# Patient Record
Sex: MALE | Race: ASIAN | NOT HISPANIC OR LATINO | URBAN - METROPOLITAN AREA
[De-identification: names, ages, dates, MRNs, and addresses within clinical notes are randomized per-mention and may not be internally consistent; named-entity substitution may affect disease eponyms.]

---

## 2021-04-19 ENCOUNTER — OUTPATIENT (OUTPATIENT)
Dept: OUTPATIENT SERVICES | Facility: HOSPITAL | Age: 71
LOS: 1 days | End: 2021-04-19
Payer: MEDICARE

## 2021-04-19 ENCOUNTER — APPOINTMENT (OUTPATIENT)
Dept: RADIOLOGY | Facility: CLINIC | Age: 71
End: 2021-04-19
Payer: MEDICARE

## 2021-04-19 ENCOUNTER — TRANSCRIPTION ENCOUNTER (OUTPATIENT)
Age: 71
End: 2021-04-19

## 2021-04-19 ENCOUNTER — APPOINTMENT (OUTPATIENT)
Dept: FAMILY MEDICINE | Facility: CLINIC | Age: 71
End: 2021-04-19
Payer: MEDICARE

## 2021-04-19 ENCOUNTER — NON-APPOINTMENT (OUTPATIENT)
Age: 71
End: 2021-04-19

## 2021-04-19 VITALS
WEIGHT: 184 LBS | HEIGHT: 69 IN | TEMPERATURE: 96.8 F | SYSTOLIC BLOOD PRESSURE: 128 MMHG | DIASTOLIC BLOOD PRESSURE: 82 MMHG | OXYGEN SATURATION: 98 % | BODY MASS INDEX: 27.25 KG/M2 | HEART RATE: 65 BPM

## 2021-04-19 DIAGNOSIS — Z13.29 ENCOUNTER FOR SCREENING FOR OTHER SUSPECTED ENDOCRINE DISORDER: ICD-10-CM

## 2021-04-19 DIAGNOSIS — Z13.1 ENCOUNTER FOR SCREENING FOR DIABETES MELLITUS: ICD-10-CM

## 2021-04-19 DIAGNOSIS — Z23 ENCOUNTER FOR IMMUNIZATION: ICD-10-CM

## 2021-04-19 DIAGNOSIS — Z13.220 ENCOUNTER FOR SCREENING FOR LIPOID DISORDERS: ICD-10-CM

## 2021-04-19 DIAGNOSIS — M25.511 PAIN IN RIGHT SHOULDER: ICD-10-CM

## 2021-04-19 DIAGNOSIS — Z11.59 ENCOUNTER FOR SCREENING FOR OTHER VIRAL DISEASES: ICD-10-CM

## 2021-04-19 DIAGNOSIS — Z13.21 ENCOUNTER FOR SCREENING FOR NUTRITIONAL DISORDER: ICD-10-CM

## 2021-04-19 DIAGNOSIS — Z12.11 ENCOUNTER FOR SCREENING FOR MALIGNANT NEOPLASM OF COLON: ICD-10-CM

## 2021-04-19 DIAGNOSIS — Z13.5 ENCOUNTER FOR SCREENING FOR EYE AND EAR DISORDERS: ICD-10-CM

## 2021-04-19 DIAGNOSIS — M25.561 PAIN IN RIGHT KNEE: ICD-10-CM

## 2021-04-19 DIAGNOSIS — Z00.00 ENCOUNTER FOR GENERAL ADULT MEDICAL EXAMINATION W/OUT ABNORMAL FINDINGS: ICD-10-CM

## 2021-04-19 DIAGNOSIS — Z86.39 PERSONAL HISTORY OF OTHER ENDOCRINE, NUTRITIONAL AND METABOLIC DISEASE: ICD-10-CM

## 2021-04-19 DIAGNOSIS — Z78.9 OTHER SPECIFIED HEALTH STATUS: ICD-10-CM

## 2021-04-19 PROCEDURE — G0439: CPT

## 2021-04-19 PROCEDURE — 73030 X-RAY EXAM OF SHOULDER: CPT | Mod: 26,RT

## 2021-04-19 PROCEDURE — 90732 PPSV23 VACC 2 YRS+ SUBQ/IM: CPT

## 2021-04-19 PROCEDURE — 93000 ELECTROCARDIOGRAM COMPLETE: CPT

## 2021-04-19 PROCEDURE — 73030 X-RAY EXAM OF SHOULDER: CPT

## 2021-04-19 PROCEDURE — 73562 X-RAY EXAM OF KNEE 3: CPT | Mod: 26,50

## 2021-04-19 PROCEDURE — 36415 COLL VENOUS BLD VENIPUNCTURE: CPT

## 2021-04-19 PROCEDURE — G0009: CPT

## 2021-04-19 PROCEDURE — 99214 OFFICE O/P EST MOD 30 MIN: CPT | Mod: 25

## 2021-04-19 PROCEDURE — 73562 X-RAY EXAM OF KNEE 3: CPT

## 2021-04-19 RX ORDER — ROSUVASTATIN CALCIUM 20 MG/1
20 TABLET, FILM COATED ORAL
Refills: 0 | Status: ACTIVE | COMMUNITY

## 2021-04-19 RX ORDER — METFORMIN ER 500 MG 500 MG/1
500 TABLET ORAL
Qty: 60 | Refills: 0 | Status: ACTIVE | COMMUNITY

## 2021-04-19 RX ORDER — ASPIRIN 81 MG
81 TABLET, DELAYED RELEASE (ENTERIC COATED) ORAL
Refills: 0 | Status: ACTIVE | COMMUNITY

## 2021-04-19 RX ORDER — LISINOPRIL 5 MG/1
5 TABLET ORAL
Refills: 0 | Status: ACTIVE | COMMUNITY

## 2021-04-19 NOTE — HEALTH RISK ASSESSMENT
[Good] : ~his/her~  mood as  good [No] : In the past 12 months have you used drugs other than those required for medical reasons? No [No falls in past year] : Patient reported no falls in the past year [0] : 2) Feeling down, depressed, or hopeless: Not at all (0) [None] : None [With Significant Other] : lives with significant other [Retired] : retired [College] : College [] :  [# Of Children ___] : has [unfilled] children [Sexually Active] : sexually active [Feels Safe at Home] : Feels safe at home [Fully functional (bathing, dressing, toileting, transferring, walking, feeding)] : Fully functional (bathing, dressing, toileting, transferring, walking, feeding) [Fully functional (using the telephone, shopping, preparing meals, housekeeping, doing laundry, using] : Fully functional and needs no help or supervision to perform IADLs (using the telephone, shopping, preparing meals, housekeeping, doing laundry, using transportation, managing medications and managing finances) [] : No [Reports changes in hearing] : Reports no changes in hearing [Reports changes in vision] : Reports no changes in vision [Reports changes in dental health] : Reports no changes in dental health [ColonoscopyDate] :  never

## 2021-04-19 NOTE — HISTORY OF PRESENT ILLNESS
[FreeTextEntry1] : new patient is here to establish care, has knee pain [de-identified] : Dr latosha tolliver - last seen 1 yr ago . no chest pain, no sob, no cough, no fever, no dizziness, no abdominal pain, no n/v/d/c/melena/brbpr/hematuria/dysuria, gerd positive, admits to chest pain 6 months ago - states for a few weeks and then resolved .  right shoulder pain  chronic tenderness on lateral shoulder \par \par bilateral knee pain - 2 yrs intermittent - now worsening x 3 months - worse with stairs    \par Dr oshea - endocrinology every 3 months - 1 week ago \par

## 2021-04-19 NOTE — PHYSICAL EXAM
[No Acute Distress] : no acute distress [Well Nourished] : well nourished [Well Developed] : well developed [Well-Appearing] : well-appearing [Normal Voice/Communication] : normal voice/communication [Normal Sclera/Conjunctiva] : normal sclera/conjunctiva [PERRL] : pupils equal round and reactive to light [EOMI] : extraocular movements intact [Normal Outer Ear/Nose] : the outer ears and nose were normal in appearance [Normal Oropharynx] : the oropharynx was normal [Normal TMs] : both tympanic membranes were normal [No JVD] : no jugular venous distention [No Lymphadenopathy] : no lymphadenopathy [Supple] : supple [Thyroid Normal, No Nodules] : the thyroid was normal and there were no nodules present [No Respiratory Distress] : no respiratory distress  [No Accessory Muscle Use] : no accessory muscle use [Clear to Auscultation] : lungs were clear to auscultation bilaterally [Normal Rate] : normal rate  [Regular Rhythm] : with a regular rhythm [Normal S1, S2] : normal S1 and S2 [No Murmur] : no murmur heard [No Carotid Bruits] : no carotid bruits [No Abdominal Bruit] : a ~M bruit was not heard ~T in the abdomen [No Varicosities] : no varicosities [Pedal Pulses Present] : the pedal pulses are present [No Edema] : there was no peripheral edema [No Palpable Aorta] : no palpable aorta [No Extremity Clubbing/Cyanosis] : no extremity clubbing/cyanosis [Soft] : abdomen soft [Non Tender] : non-tender [Non-distended] : non-distended [No Masses] : no abdominal mass palpated [No HSM] : no HSM [Normal Bowel Sounds] : normal bowel sounds [Normal Posterior Cervical Nodes] : no posterior cervical lymphadenopathy [Normal Anterior Cervical Nodes] : no anterior cervical lymphadenopathy [No CVA Tenderness] : no CVA  tenderness [No Spinal Tenderness] : no spinal tenderness [No Joint Swelling] : no joint swelling [Grossly Normal Strength/Tone] : grossly normal strength/tone [No Rash] : no rash [Coordination Grossly Intact] : coordination grossly intact [No Focal Deficits] : no focal deficits [Normal Gait] : normal gait [Speech Grossly Normal] : speech grossly normal [Normal Affect] : the affect was normal [Alert and Oriented x3] : oriented to person, place, and time [Normal Mood] : the mood was normal [Normal Insight/Judgement] : insight and judgment were intact [de-identified] : normal rom of knees and shoulders- some discomfort on right knee with flexion , tenderness of lateral right shoulder

## 2021-04-22 ENCOUNTER — NON-APPOINTMENT (OUTPATIENT)
Age: 71
End: 2021-04-22

## 2021-04-22 ENCOUNTER — APPOINTMENT (OUTPATIENT)
Dept: CARDIOLOGY | Facility: CLINIC | Age: 71
End: 2021-04-22
Payer: MEDICARE

## 2021-04-22 VITALS
DIASTOLIC BLOOD PRESSURE: 90 MMHG | SYSTOLIC BLOOD PRESSURE: 120 MMHG | BODY MASS INDEX: 27.25 KG/M2 | HEART RATE: 67 BPM | WEIGHT: 184 LBS | OXYGEN SATURATION: 96 % | HEIGHT: 69 IN

## 2021-04-22 DIAGNOSIS — R07.9 CHEST PAIN, UNSPECIFIED: ICD-10-CM

## 2021-04-22 DIAGNOSIS — R94.31 ABNORMAL ELECTROCARDIOGRAM [ECG] [EKG]: ICD-10-CM

## 2021-04-22 LAB
25(OH)D3 SERPL-MCNC: 35.5 NG/ML
ALBUMIN SERPL ELPH-MCNC: 4.4 G/DL
ALP BLD-CCNC: 50 U/L
ALT SERPL-CCNC: 23 U/L
ANION GAP SERPL CALC-SCNC: 12 MMOL/L
AST SERPL-CCNC: 29 U/L
BASOPHILS # BLD AUTO: 0.04 K/UL
BASOPHILS NFR BLD AUTO: 0.8 %
BILIRUB SERPL-MCNC: 0.9 MG/DL
BUN SERPL-MCNC: 14 MG/DL
CALCIUM SERPL-MCNC: 9.2 MG/DL
CHLORIDE SERPL-SCNC: 105 MMOL/L
CHOLEST SERPL-MCNC: 159 MG/DL
CO2 SERPL-SCNC: 21 MMOL/L
COVID-19 NUCLEOCAPSID  GAM ANTIBODY INTERPRETATION: NEGATIVE
COVID-19 SPIKE DOMAIN ANTIBODY INTERPRETATION: POSITIVE
CREAT SERPL-MCNC: 0.82 MG/DL
EOSINOPHIL # BLD AUTO: 0.19 K/UL
EOSINOPHIL NFR BLD AUTO: 3.9 %
ESTIMATED AVERAGE GLUCOSE: 140 MG/DL
GLUCOSE SERPL-MCNC: 113 MG/DL
HBA1C MFR BLD HPLC: 6.5 %
HCT VFR BLD CALC: 45.3 %
HDLC SERPL-MCNC: 46 MG/DL
HGB BLD-MCNC: 14.6 G/DL
IMM GRANULOCYTES NFR BLD AUTO: 0.2 %
LDLC SERPL CALC-MCNC: 97 MG/DL
LYMPHOCYTES # BLD AUTO: 1.94 K/UL
LYMPHOCYTES NFR BLD AUTO: 39.4 %
MAN DIFF?: NORMAL
MCHC RBC-ENTMCNC: 28.7 PG
MCHC RBC-ENTMCNC: 32.2 GM/DL
MCV RBC AUTO: 89 FL
MONOCYTES # BLD AUTO: 0.53 K/UL
MONOCYTES NFR BLD AUTO: 10.8 %
NEUTROPHILS # BLD AUTO: 2.21 K/UL
NEUTROPHILS NFR BLD AUTO: 44.9 %
NONHDLC SERPL-MCNC: 114 MG/DL
PLATELET # BLD AUTO: 169 K/UL
POTASSIUM SERPL-SCNC: 4.5 MMOL/L
PROT SERPL-MCNC: 7.4 G/DL
PSA FREE FLD-MCNC: 31 %
PSA FREE SERPL-MCNC: 0.34 NG/ML
PSA SERPL-MCNC: 1.11 NG/ML
RBC # BLD: 5.09 M/UL
RBC # FLD: 13.2 %
SARS-COV-2 AB SERPL IA-ACNC: >250 U/ML
SARS-COV-2 AB SERPL QL IA: 0.09 INDEX
SODIUM SERPL-SCNC: 138 MMOL/L
TRIGL SERPL-MCNC: 86 MG/DL
TSH SERPL-ACNC: 0.7 UIU/ML
WBC # FLD AUTO: 4.92 K/UL

## 2021-04-22 PROCEDURE — 99204 OFFICE O/P NEW MOD 45 MIN: CPT | Mod: 25

## 2021-04-22 PROCEDURE — 93000 ELECTROCARDIOGRAM COMPLETE: CPT

## 2021-04-22 NOTE — DISCUSSION/SUMMARY
[FreeTextEntry1] : Pt is a 71 y/o M who is referred here today by their PCP for evaluation.  Pt has PMH HTN, HLD, DM. He states that he has been getting non-radiating CP.  When he gets it, lasts one week on/off.  Not associated with exertion.  He denies SOB, diaphoresis, palpitations, dizziness, syncope, LE edema, PND, orthopnea. \par COVID vaccine 03/2021 Pfizer\par \par Will check transthoracic echocardiogram to evaluate left ventricular function and assess for any structural abnormalities\par Will check nuclear stress test to eval for ischemia\par check CUS\par \par HTN:\par mildly elevated - will monitor\par c/w current meds\par Advised low salt diet\par \par HLD:\par c/w statin - goal LDL <70\par Advised lifestyle modifications\par \par DM:\par follows with PCP\par goal A1c <7\par \par The described plan was discussed with the pt.  All questions and concerns were addressed to the best of my knowledge.

## 2021-04-22 NOTE — REASON FOR VISIT
[Consultation] : a consultation regarding [Chest Pain] : chest pain [Hyperlipidemia] : hyperlipidemia [Hypertension] : hypertension

## 2021-04-22 NOTE — HISTORY OF PRESENT ILLNESS
[FreeTextEntry1] : Pt is a 69 y/o M who is referred here today by their PCP for evaluation.  Pt has PMH HTN, HLD, DM. He states that he has been getting non-radiating CP.  When he gets it, lasts one week on/off.  Not associated with exertion.  He denies SOB, diaphoresis, palpitations, dizziness, syncope, LE edema, PND, orthopnea. \par COVID vaccine 2021 Pfizer\par \par PMH: HTN, HLD, DM\par Family hx: no cardiac disease\par Smoking status: never\par social ETOH\par no drug use\par Current exercise: none\par Daily water intake: 32 oz\par Daily caffeine intake: 2 cups coffee/tea\par OTC medications: ASA, B12\par Previous cardiac testin stress test "normal"\par Previous hospitalizations: none\par

## 2021-04-23 ENCOUNTER — APPOINTMENT (OUTPATIENT)
Dept: CARDIOLOGY | Facility: CLINIC | Age: 71
End: 2021-04-23
Payer: MEDICARE

## 2021-04-23 PROCEDURE — 93017 CV STRESS TEST TRACING ONLY: CPT

## 2021-04-23 PROCEDURE — 93018 CV STRESS TEST I&R ONLY: CPT

## 2021-04-23 PROCEDURE — 78452 HT MUSCLE IMAGE SPECT MULT: CPT

## 2021-04-23 PROCEDURE — A9500: CPT

## 2021-04-27 ENCOUNTER — APPOINTMENT (OUTPATIENT)
Dept: CARDIOLOGY | Facility: CLINIC | Age: 71
End: 2021-04-27
Payer: MEDICARE

## 2021-04-27 PROCEDURE — 93306 TTE W/DOPPLER COMPLETE: CPT

## 2021-04-29 ENCOUNTER — NON-APPOINTMENT (OUTPATIENT)
Age: 71
End: 2021-04-29

## 2021-10-03 ENCOUNTER — TRANSCRIPTION ENCOUNTER (OUTPATIENT)
Age: 71
End: 2021-10-03

## 2022-02-28 ENCOUNTER — TRANSCRIPTION ENCOUNTER (OUTPATIENT)
Age: 72
End: 2022-02-28

## 2022-03-28 ENCOUNTER — APPOINTMENT (OUTPATIENT)
Dept: FAMILY MEDICINE | Facility: CLINIC | Age: 72
End: 2022-03-28
Payer: MEDICARE

## 2022-03-28 ENCOUNTER — NON-APPOINTMENT (OUTPATIENT)
Age: 72
End: 2022-03-28

## 2022-03-28 VITALS
BODY MASS INDEX: 27.25 KG/M2 | HEIGHT: 69 IN | OXYGEN SATURATION: 97 % | HEART RATE: 83 BPM | TEMPERATURE: 97.3 F | DIASTOLIC BLOOD PRESSURE: 80 MMHG | SYSTOLIC BLOOD PRESSURE: 134 MMHG | WEIGHT: 184 LBS

## 2022-03-28 DIAGNOSIS — H26.9 UNSPECIFIED CATARACT: ICD-10-CM

## 2022-03-28 PROCEDURE — 93000 ELECTROCARDIOGRAM COMPLETE: CPT

## 2022-03-28 PROCEDURE — 99214 OFFICE O/P EST MOD 30 MIN: CPT | Mod: 25

## 2022-03-29 NOTE — ASSESSMENT
[As per surgery] : as per surgery [Patient Optimized for Surgery] : Patient optimized for surgery [FreeTextEntry4] : 70 yo male with pmh of diabetes, hld presents for clearance for cataract removal

## 2022-03-29 NOTE — PLAN
[FreeTextEntry1] : recent labs from endocrinology stable \par cardio reviewed repeat ekg stable\par pt medically optimized for procedure

## 2022-03-29 NOTE — HISTORY OF PRESENT ILLNESS
[No Pertinent Cardiac History] : no history of aortic stenosis, atrial fibrillation, coronary artery disease, recent myocardial infarction, or implantable device/pacemaker [No Pertinent Pulmonary History] : no history of asthma, COPD, sleep apnea, or smoking [No Adverse Anesthesia Reaction] : no adverse anesthesia reaction in self or family member [Diabetes] : diabetes [(Patient denies any chest pain, claudication, dyspnea on exertion, orthopnea, palpitations or syncope)] : Patient denies any chest pain, claudication, dyspnea on exertion, orthopnea, palpitations or syncope [Chronic Anticoagulation] : no chronic anticoagulation [Chronic Kidney Disease] : no chronic kidney disease [FreeTextEntry1] : cataracts [FreeTextEntry2] : 4/5/22 [FreeTextEntry3] : dr palacios [FreeTextEntry4] : no chest pain, no sob, no cough, no fever, no dizziness, no abdominal pain, no n/v/d/c/melena/brbpr/hematuria/dysuria\par

## 2022-07-28 ENCOUNTER — NON-APPOINTMENT (OUTPATIENT)
Age: 72
End: 2022-07-28

## 2022-09-13 ENCOUNTER — NON-APPOINTMENT (OUTPATIENT)
Age: 72
End: 2022-09-13

## 2022-09-13 ENCOUNTER — APPOINTMENT (OUTPATIENT)
Dept: FAMILY MEDICINE | Facility: CLINIC | Age: 72
End: 2022-09-13

## 2022-09-13 VITALS
WEIGHT: 182 LBS | SYSTOLIC BLOOD PRESSURE: 130 MMHG | DIASTOLIC BLOOD PRESSURE: 80 MMHG | HEIGHT: 69 IN | TEMPERATURE: 96.7 F | HEART RATE: 82 BPM | OXYGEN SATURATION: 99 % | BODY MASS INDEX: 26.96 KG/M2

## 2022-09-13 DIAGNOSIS — H43.399 OTHER VITREOUS OPACITIES, UNSPECIFIED EYE: ICD-10-CM

## 2022-09-13 PROCEDURE — 93000 ELECTROCARDIOGRAM COMPLETE: CPT

## 2022-09-13 PROCEDURE — 99213 OFFICE O/P EST LOW 20 MIN: CPT | Mod: 25

## 2022-09-13 PROCEDURE — 36415 COLL VENOUS BLD VENIPUNCTURE: CPT

## 2022-09-13 RX ORDER — BROMFENAC 0.76 MG/ML
0.07 SOLUTION/ DROPS OPHTHALMIC
Qty: 5 | Refills: 0 | Status: DISCONTINUED | COMMUNITY
Start: 2022-04-20

## 2022-09-13 RX ORDER — PANTOPRAZOLE 40 MG/1
40 TABLET, DELAYED RELEASE ORAL
Qty: 90 | Refills: 0 | Status: ACTIVE | COMMUNITY
Start: 2022-08-25

## 2022-09-13 RX ORDER — LANCETS 33 GAUGE
EACH MISCELLANEOUS
Qty: 100 | Refills: 0 | Status: ACTIVE | COMMUNITY
Start: 2022-03-30

## 2022-09-13 RX ORDER — MOXIFLOXACIN OPHTHALMIC 5 MG/ML
0.5 SOLUTION/ DROPS OPHTHALMIC
Qty: 3 | Refills: 0 | Status: DISCONTINUED | COMMUNITY
Start: 2022-04-29

## 2022-09-13 RX ORDER — BLOOD SUGAR DIAGNOSTIC
STRIP MISCELLANEOUS
Qty: 50 | Refills: 0 | Status: ACTIVE | COMMUNITY
Start: 2022-03-30

## 2022-09-14 LAB
ALBUMIN SERPL ELPH-MCNC: 4.4 G/DL
ALP BLD-CCNC: 46 U/L
ALT SERPL-CCNC: 20 U/L
ANION GAP SERPL CALC-SCNC: 8 MMOL/L
APPEARANCE: CLEAR
AST SERPL-CCNC: 18 U/L
BACTERIA: NEGATIVE
BASOPHILS # BLD AUTO: 0.04 K/UL
BASOPHILS NFR BLD AUTO: 0.9 %
BILIRUB SERPL-MCNC: 0.6 MG/DL
BILIRUBIN URINE: NEGATIVE
BLOOD URINE: NEGATIVE
BUN SERPL-MCNC: 14 MG/DL
CALCIUM SERPL-MCNC: 9.7 MG/DL
CHLORIDE SERPL-SCNC: 105 MMOL/L
CHOLEST SERPL-MCNC: 156 MG/DL
CO2 SERPL-SCNC: 24 MMOL/L
COLOR: YELLOW
CREAT SERPL-MCNC: 0.84 MG/DL
EGFR: 93 ML/MIN/1.73M2
EOSINOPHIL # BLD AUTO: 0.2 K/UL
EOSINOPHIL NFR BLD AUTO: 4.6 %
ESTIMATED AVERAGE GLUCOSE: 140 MG/DL
GLUCOSE QUALITATIVE U: NEGATIVE
GLUCOSE SERPL-MCNC: 116 MG/DL
HBA1C MFR BLD HPLC: 6.5 %
HCT VFR BLD CALC: 44.4 %
HDLC SERPL-MCNC: 45 MG/DL
HGB BLD-MCNC: 15.1 G/DL
HYALINE CASTS: 0 /LPF
IMM GRANULOCYTES NFR BLD AUTO: 0.2 %
KETONES URINE: NEGATIVE
LDLC SERPL CALC-MCNC: 92 MG/DL
LEUKOCYTE ESTERASE URINE: NEGATIVE
LYMPHOCYTES # BLD AUTO: 1.69 K/UL
LYMPHOCYTES NFR BLD AUTO: 38.7 %
MAN DIFF?: NORMAL
MCHC RBC-ENTMCNC: 28.5 PG
MCHC RBC-ENTMCNC: 34 GM/DL
MCV RBC AUTO: 83.9 FL
MICROSCOPIC-UA: NORMAL
MONOCYTES # BLD AUTO: 0.35 K/UL
MONOCYTES NFR BLD AUTO: 8 %
NEUTROPHILS # BLD AUTO: 2.08 K/UL
NEUTROPHILS NFR BLD AUTO: 47.6 %
NITRITE URINE: NEGATIVE
NONHDLC SERPL-MCNC: 111 MG/DL
PH URINE: 6
PLATELET # BLD AUTO: 182 K/UL
POTASSIUM SERPL-SCNC: 4.8 MMOL/L
PROT SERPL-MCNC: 7 G/DL
PROTEIN URINE: NEGATIVE
RBC # BLD: 5.29 M/UL
RBC # FLD: 13.2 %
RED BLOOD CELLS URINE: 3 /HPF
SODIUM SERPL-SCNC: 137 MMOL/L
SPECIFIC GRAVITY URINE: 1.02
SQUAMOUS EPITHELIAL CELLS: 0 /HPF
TRIGL SERPL-MCNC: 96 MG/DL
TSH SERPL-ACNC: 0.9 UIU/ML
UROBILINOGEN URINE: NORMAL
WBC # FLD AUTO: 4.37 K/UL
WHITE BLOOD CELLS URINE: 1 /HPF

## 2022-09-14 NOTE — HISTORY OF PRESENT ILLNESS
[No Pertinent Cardiac History] : no history of aortic stenosis, atrial fibrillation, coronary artery disease, recent myocardial infarction, or implantable device/pacemaker [No Pertinent Pulmonary History] : no history of asthma, COPD, sleep apnea, or smoking [No Adverse Anesthesia Reaction] : no adverse anesthesia reaction in self or family member [Diabetes] : diabetes [(Patient denies any chest pain, claudication, dyspnea on exertion, orthopnea, palpitations or syncope)] : Patient denies any chest pain, claudication, dyspnea on exertion, orthopnea, palpitations or syncope [Chronic Anticoagulation] : no chronic anticoagulation [Chronic Kidney Disease] : no chronic kidney disease [FreeTextEntry1] : vitrectomy  [FreeTextEntry2] : 9/18/22 [FreeTextEntry3] : Dr holman [FreeTextEntry4] : no chest pain, no sob, no cough, no fever, no dizziness, no abdominal pain, no n/v/d/c/melena/brbpr/hematuria/dysuria\par

## 2022-09-14 NOTE — ASSESSMENT
[As per surgery] : as per surgery [Patient Optimized for Surgery] : Patient optimized for surgery [FreeTextEntry4] : 70 yo male with pmh of diabetes, hld presents for clearance for vitrectomy

## 2022-10-31 ENCOUNTER — APPOINTMENT (OUTPATIENT)
Dept: ORTHOPEDIC SURGERY | Facility: CLINIC | Age: 72
End: 2022-10-31

## 2022-10-31 VITALS — HEIGHT: 69 IN | WEIGHT: 182 LBS | BODY MASS INDEX: 26.96 KG/M2

## 2022-10-31 PROCEDURE — 99214 OFFICE O/P EST MOD 30 MIN: CPT | Mod: 25

## 2022-10-31 PROCEDURE — 20611 DRAIN/INJ JOINT/BURSA W/US: CPT | Mod: LT

## 2022-10-31 NOTE — DISCUSSION/SUMMARY
[de-identified] : Patient received marley knee CSI. He will follow up if the CSI provides no relief. We will then consider hyaluronic acid injections. \par \par Cortisone Knee Injection - Right, ultrasound guided injection \par \par The risks, benefits, and alternatives to cortisone injection were explained in full to the patient. Risks outlined include but are not limited to infection, sepsis, bleeding, scarring, skin discoloration, temporary increase in pain, syncopal episode, failure to resolve symptoms, allergic reaction, symptom recurrence, and elevation of blood sugar in diabetics. Patient understood the risks. All questions were answered. After discussion of options, patient requested an injection. Oral informed consent was obtained and sterile prep was done of the injection site. A mixture of 40mg of Kenalog, 2cc of 1% Lidocaine was sterilely prepared by me. Sterile technique was used to introduce the mixture into the right knee. Ultrasound was used for proper needle placement. Patient tolerated the procedure well. Advised to ice the injection site this evening.\par \par \par RB&A to corticosteroid injection discussed.\par All questions were answered.\par Patient wishes to move forward with injection today. \par \par \par Cortisone Knee Injection - Left, ultrasound guided injection\par \par The risks, benefits, and alternatives to cortisone injection were explained in full to the patient. Risks outlined include but are not limited to infection, sepsis, bleeding, scarring, skin discoloration, temporary increase in pain, syncopal episode, failure to resolve symptoms, allergic reaction, symptom recurrence, and elevation of blood sugar in diabetics. Patient understood the risks. All questions were answered. After discussion of options, patient requested an injection. Oral informed consent was obtained and sterile prep was done of the injection site. A mixture of 40mg of Kenalog, 2cc of 1% Lidocaine was sterilely prepared by me. Sterile technique was used to introduce the mixture into the left knee. Ultrasound was used for proper needle placement. Patient tolerated the procedure well. Advised to ice the injection site this evening.\par \par \par RB&A to corticosteroid injection discussed.\par All questions were answered.\par Patient wishes to move forward with injection today. \par \par \par "Written by Cesar Enrique, acting as Scribe for Chapo Stewart M.D" \par \par Home Exercise \par \par  The patient is instructed on a home exercise program. \par  \par RICE \par I explained to the patient that rest, ice, compression, and elevation would benefit them.  They may return to activity after follow-up or when they no longer have any pain. \par  \par Pain Guide Activities \par The patient was advised to let pain guide the gradual advancement of activities. \par  \par Activity Modification \par The patient was advised to modify their activities. \par  \par Dx / Natural History \par The patient was advised of the diagnosis.  The natural history of the pathology was explained in full to the patient in layman's terms.  Several different treatment options were discussed and explained in full to the patient including the risks and benefits of both surgical and non-surgical treatments.  All questions and concerns were answered.\par

## 2022-10-31 NOTE — HISTORY OF PRESENT ILLNESS
[de-identified] : 10/31/22: Patient presents for FUV marley knees. He reports that his knee pain has retuned. After his last visit apx 10 months ago his knee pain was not bad, has recent returned. \par \par 12/20/21: Patient presents for FUV marley knee. The gel injections that the patient received helped relieve pain. \par 11/10/21: Patient presents for follow up visit marley knees R>L medial. Had an allergic reaction to Mobic. Had itching and\par rash all over body. Reports that his knees are better than last visit. When he uses a knee brace that helps relieve pain.\par He would like to be able to have no pain without the knee brace. Continues to have pain after use of NSAIDs, Mobic\par and cortisone injections.\par Date of Injury/Onset: 3 years \par Pain: At Rest: 0/10 \par With Activity: 1-2/10 \par Mechanism of injury: none\par This is not a Work Related Injury being treated under Worker's Compensation.\par This is not an athletic injury occurring associated with an interscholastic or organized sports team.\par Quality of symptoms: achy \par Improves with: rest\par Worse with: stairs, standing , overuse \par Treatment/Imaging/Studies Since Last Visit: PT & Naprosyn \par Reports Available For Review Today: n/a\par Out of work/sport: _, since _\par School/Sport/Position/Occupation: retired \par Additional Information: None

## 2022-10-31 NOTE — DATA REVIEWED
[FreeTextEntry1] : 11/8/21 MRI RIGHT KNEE CHANI KRISHNAN\par Impression:\par 1. Complex medial meniscal tearing, lateral meniscal degeneration and tricompartmental arthrosis with subchondral\par edema in the medial and patellofemoral compartments with \par subchondral cysts in the posterior medial femoral condyle which appear degenerative.\par 2. Mild effusion, synovitis, small popliteal cyst and medial gastrocnemius muscle strain.\par 3. Mild chronic MCL sprain with laxity.\par 4. Extensor mechanism tendinopathy with insertional quadriceps tendon bone spurs without tear.\par 5. Lateral meniscal degeneration and fraying without tear.\par 6 No acute fracture

## 2022-10-31 NOTE — PHYSICAL EXAM
[Bilateral] : knee bilaterally [5___] : hamstring 5[unfilled]/5 [Positive] : positive Levi [] : non-antalgic

## 2022-11-01 ENCOUNTER — OFFICE (OUTPATIENT)
Dept: URBAN - METROPOLITAN AREA CLINIC 88 | Facility: CLINIC | Age: 72
Setting detail: OPHTHALMOLOGY
End: 2022-11-01
Payer: MEDICARE

## 2022-11-01 DIAGNOSIS — H35.372: ICD-10-CM

## 2022-11-01 PROCEDURE — 99024 POSTOP FOLLOW-UP VISIT: CPT | Performed by: OPHTHALMOLOGY

## 2022-11-01 ASSESSMENT — LID EXAM ASSESSMENTS
OD_BLEPHARITIS: 1+
OS_BLEPHARITIS: 1+

## 2022-11-01 ASSESSMENT — REFRACTION_AUTOREFRACTION
OD_AXIS: 162
OS_CYLINDER: -0.25
OS_AXIS: 158
OD_CYLINDER: -0.25
OS_SPHERE: -0.75
OD_SPHERE: -0.50

## 2022-11-01 ASSESSMENT — AXIALLENGTH_DERIVED
OD_AL: 25.4922
OS_AL: 26.0466

## 2022-11-01 ASSESSMENT — TONOMETRY: OD_IOP_MMHG: 10

## 2022-11-01 ASSESSMENT — KERATOMETRY
OS_AXISANGLE_DEGREES: 180
OD_K2POWER_DIOPTERS: 39.50
OD_K1POWER_DIOPTERS: 39.25
OS_K2POWER_DIOPTERS: 38.50
OD_AXISANGLE_DEGREES: 059
OS_K1POWER_DIOPTERS: 38.25

## 2022-11-01 ASSESSMENT — VISUAL ACUITY
OS_BCVA: 20/25
OD_BCVA: 20/50-

## 2022-11-01 ASSESSMENT — SPHEQUIV_DERIVED
OS_SPHEQUIV: -0.875
OD_SPHEQUIV: -0.625

## 2022-11-01 ASSESSMENT — CONFRONTATIONAL VISUAL FIELD TEST (CVF)
OS_FINDINGS: FULL
OD_FINDINGS: FULL

## 2022-11-04 ENCOUNTER — NON-APPOINTMENT (OUTPATIENT)
Age: 72
End: 2022-11-04

## 2022-11-09 ENCOUNTER — APPOINTMENT (OUTPATIENT)
Dept: ORTHOPEDIC SURGERY | Facility: CLINIC | Age: 72
End: 2022-11-09

## 2022-11-09 PROCEDURE — 73564 X-RAY EXAM KNEE 4 OR MORE: CPT | Mod: 50

## 2022-11-09 PROCEDURE — 20611 DRAIN/INJ JOINT/BURSA W/US: CPT

## 2022-11-09 PROCEDURE — 99214 OFFICE O/P EST MOD 30 MIN: CPT | Mod: 25

## 2022-11-11 NOTE — PHYSICAL EXAM
[Bilateral] : knee bilaterally [5___] : hamstring 5[unfilled]/5 [Positive] : positive Levi [de-identified] : 11/9/22 x-ray 4 views BILATERAL knees: Unchanged from prior  [] : non-antalgic

## 2022-11-11 NOTE — DISCUSSION/SUMMARY
[de-identified] : Follow up after MRI of right knee to eval medial meniscus tear. \par \par Patient had flushing reaction to CSI which has now resolved.\par \par Bilateral MONOVISC injections administered today using ultrasound for proper placement. \par --\par MONOVISC RIGHT KNEE Ultrasound Guided \par Procedure Verification:\par Procedure confirmed with patient or family/designee\par Consent for procedure: Verbal Consent Given\par Relevant documentation completed, reviewed, and signed\par Clinical indications for procedure confirmed\par \par Time-out with all members of procedure team immediately prior to procedure:\par Correct patient identified. Agreement on procedure. Correct side and site.\par \par KNEE INTRAARTICULAR INJECTION - RIGHT\par After verbal consent and identification of the correct patient and correct site, the RIGHT knee was prepped using alcohol swabs and betadine. This was allowed time to air dry.  The pre-loaded MONOVISC was injected into the RIGHT knee via the lateral knee portal with at 1 ½ inch 22G needle.  The patient tolerated the procedure well.  A sterile dressing was placed.  After-care instructions were provided and included instructions to ice the area and to call if redness, pain, or fever develop.\par  \par  --\par \par MONOVISC LEFT KNEE - Ultrasound Guided  \par Procedure Verification:\par Procedure confirmed with patient or family/designee\par Consent for procedure: Verbal Consent Given\par Relevant documentation completed, reviewed, and signed\par Clinical indications for procedure confirmed\par \par Time-out with all members of procedure team immediately prior to procedure:\par Correct patient identified. Agreement on procedure. Correct side and site.\par \par KNEE INTRAARTICULAR INJECTION - LEFT\par After verbal consent and identification of the correct patient and correct site, the LEFT knee was prepped using alcohol swabs and betadine. This was allowed time to air dry.  The pre-loaded MONOVISC was injected into the LEFT knee via the lateral knee portal with at 1 ½ inch 22G needle.  The patient tolerated the procedure well.  A sterile dressing was placed.  After-care instructions were provided and included instructions to ice the area and to call if redness, pain, or fever develop.\par \par -----------------------------------------------\par Home Exercise\par The patient is instructed on a home exercise program.\par \par OLGA DIXON Acting as a Scribe for Dr. Stewart\par I, Olga Dixon, attest that this documentation has been prepared under the direction and in the presence of Provider Chapo Stewart MD.\par \par Activity Modification\par The patient was advised to modify their activities.\par \par Dx / Natural History\par The patient was advised of the diagnosis.  The natural history of the pathology was explained in full to the patient in layman's terms.  Several different treatment options were discussed and explained in full to the patient including the risks and benefits of both surgical and non-surgical treatments.  All questions and concerns were answered.\par \par Pain Guide Activities\par The patient was advised to let pain guide the gradual advancement of activities.\par \par RICE\par I explained to the patient that rest, ice, compression, and elevation would benefit them.  They may return to activity after follow-up or when they no longer have any pain. \par  \par

## 2022-11-11 NOTE — HISTORY OF PRESENT ILLNESS
[de-identified] : 10/31/22: Patient presents for FUV marley knees. He reports that his knee pain has retuned. After his last visit apx 10 months ago his knee pain was not bad, has recent returned. \par \par 12/20/21: Patient presents for FUV marley knee. The gel injections that the patient received helped relieve pain. \par 11/10/21: Patient presents for follow up visit marley knees R>L medial. Had an allergic reaction to Mobic. Had itching and\par rash all over body. Reports that his knees are better than last visit. When he uses a knee brace that helps relieve pain.\par He would like to be able to have no pain without the knee brace. Continues to have pain after use of NSAIDs, Mobic\par and cortisone injections.\par Date of Injury/Onset: 3 years \par Pain: At Rest: 0/10 \par With Activity: 1-2/10 \par Mechanism of injury: none\par This is not a Work Related Injury being treated under Worker's Compensation.\par This is not an athletic injury occurring associated with an interscholastic or organized sports team.\par Quality of symptoms: achy \par Improves with: rest\par Worse with: stairs, standing , overuse \par Treatment/Imaging/Studies Since Last Visit: PT & Naprosyn \par Reports Available For Review Today: n/a\par Out of work/sport: _, since _\par School/Sport/Position/Occupation: retired \par Additional Information: None

## 2022-11-17 ENCOUNTER — FORM ENCOUNTER (OUTPATIENT)
Age: 72
End: 2022-11-17

## 2022-11-18 ENCOUNTER — APPOINTMENT (OUTPATIENT)
Dept: MRI IMAGING | Facility: CLINIC | Age: 72
End: 2022-11-18

## 2022-11-18 PROCEDURE — 73721 MRI JNT OF LWR EXTRE W/O DYE: CPT | Mod: RT,MH

## 2022-12-05 ENCOUNTER — APPOINTMENT (OUTPATIENT)
Dept: ORTHOPEDIC SURGERY | Facility: CLINIC | Age: 72
End: 2022-12-05

## 2023-01-19 ENCOUNTER — APPOINTMENT (OUTPATIENT)
Dept: FAMILY MEDICINE | Facility: CLINIC | Age: 73
End: 2023-01-19
Payer: MEDICARE

## 2023-01-19 ENCOUNTER — RESULT REVIEW (OUTPATIENT)
Age: 73
End: 2023-01-19

## 2023-01-19 VITALS
TEMPERATURE: 98.2 F | BODY MASS INDEX: 26.88 KG/M2 | DIASTOLIC BLOOD PRESSURE: 72 MMHG | HEART RATE: 72 BPM | OXYGEN SATURATION: 97 % | SYSTOLIC BLOOD PRESSURE: 120 MMHG | WEIGHT: 182 LBS

## 2023-01-19 DIAGNOSIS — R10.32 LEFT LOWER QUADRANT PAIN: ICD-10-CM

## 2023-01-19 PROCEDURE — 36415 COLL VENOUS BLD VENIPUNCTURE: CPT

## 2023-01-19 PROCEDURE — 99214 OFFICE O/P EST MOD 30 MIN: CPT | Mod: 25

## 2023-01-19 NOTE — HISTORY OF PRESENT ILLNESS
[FreeTextEntry8] : patient with stomach pain for about 5 days .\par admits to lower abdominal pain for 5 days states worsening in intensity - states worse with standing and walking , 9/10 in severity , radiating across abdomen . no fever, no n/v/d/c . no melena, no brbpr. \par no chest pain , no sob.  \par

## 2023-01-19 NOTE — PHYSICAL EXAM
[No Acute Distress] : no acute distress [Well Nourished] : well nourished [Well Developed] : well developed [Well-Appearing] : well-appearing [Normal Voice/Communication] : normal voice/communication [Normal Sclera/Conjunctiva] : normal sclera/conjunctiva [Normal Outer Ear/Nose] : the outer ears and nose were normal in appearance [No JVD] : no jugular venous distention [No Respiratory Distress] : no respiratory distress  [No Accessory Muscle Use] : no accessory muscle use [Clear to Auscultation] : lungs were clear to auscultation bilaterally [Normal Rate] : normal rate  [Regular Rhythm] : with a regular rhythm [Normal S1, S2] : normal S1 and S2 [No Murmur] : no murmur heard [No Edema] : there was no peripheral edema [Soft] : abdomen soft [Non-distended] : non-distended [No Masses] : no abdominal mass palpated [No HSM] : no HSM [Normal Bowel Sounds] : normal bowel sounds [No CVA Tenderness] : no CVA  tenderness [No Spinal Tenderness] : no spinal tenderness [No Joint Swelling] : no joint swelling [Grossly Normal Strength/Tone] : grossly normal strength/tone [No Rash] : no rash [Coordination Grossly Intact] : coordination grossly intact [No Focal Deficits] : no focal deficits [Normal Gait] : normal gait [Speech Grossly Normal] : speech grossly normal [Normal Affect] : the affect was normal [Alert and Oriented x3] : oriented to person, place, and time [Normal Mood] : the mood was normal [Normal Insight/Judgement] : insight and judgment were intact [de-identified] : LLQ tenderness to palpation , no rebound

## 2023-01-20 ENCOUNTER — APPOINTMENT (OUTPATIENT)
Dept: CT IMAGING | Facility: CLINIC | Age: 73
End: 2023-01-20
Payer: MEDICARE

## 2023-01-20 ENCOUNTER — OUTPATIENT (OUTPATIENT)
Dept: OUTPATIENT SERVICES | Facility: HOSPITAL | Age: 73
LOS: 1 days | End: 2023-01-20
Payer: MEDICARE

## 2023-01-20 DIAGNOSIS — R10.32 LEFT LOWER QUADRANT PAIN: ICD-10-CM

## 2023-01-20 LAB
ALBUMIN SERPL ELPH-MCNC: 4.3 G/DL
ALP BLD-CCNC: 50 U/L
ALT SERPL-CCNC: 23 U/L
ANION GAP SERPL CALC-SCNC: 12 MMOL/L
APPEARANCE: CLEAR
AST SERPL-CCNC: 22 U/L
BACTERIA: NEGATIVE
BASOPHILS # BLD AUTO: 0.04 K/UL
BASOPHILS NFR BLD AUTO: 0.8 %
BILIRUB SERPL-MCNC: 0.6 MG/DL
BILIRUBIN URINE: NEGATIVE
BLOOD URINE: NEGATIVE
BUN SERPL-MCNC: 11 MG/DL
CALCIUM SERPL-MCNC: 10 MG/DL
CHLORIDE SERPL-SCNC: 103 MMOL/L
CHOLEST SERPL-MCNC: 137 MG/DL
CO2 SERPL-SCNC: 24 MMOL/L
COLOR: NORMAL
CREAT SERPL-MCNC: 0.83 MG/DL
EGFR: 92 ML/MIN/1.73M2
EOSINOPHIL # BLD AUTO: 0.46 K/UL
EOSINOPHIL NFR BLD AUTO: 8.9 %
ESTIMATED AVERAGE GLUCOSE: 140 MG/DL
GLUCOSE QUALITATIVE U: NEGATIVE
GLUCOSE SERPL-MCNC: 161 MG/DL
HBA1C MFR BLD HPLC: 6.5 %
HCT VFR BLD CALC: 46.6 %
HDLC SERPL-MCNC: 39 MG/DL
HGB BLD-MCNC: 15 G/DL
HYALINE CASTS: 0 /LPF
IMM GRANULOCYTES NFR BLD AUTO: 0.2 %
KETONES URINE: NEGATIVE
LDLC SERPL CALC-MCNC: 46 MG/DL
LEUKOCYTE ESTERASE URINE: ABNORMAL
LYMPHOCYTES # BLD AUTO: 1.54 K/UL
LYMPHOCYTES NFR BLD AUTO: 29.7 %
MAN DIFF?: NORMAL
MCHC RBC-ENTMCNC: 28.4 PG
MCHC RBC-ENTMCNC: 32.2 GM/DL
MCV RBC AUTO: 88.3 FL
MICROSCOPIC-UA: NORMAL
MONOCYTES # BLD AUTO: 0.5 K/UL
MONOCYTES NFR BLD AUTO: 9.6 %
NEUTROPHILS # BLD AUTO: 2.64 K/UL
NEUTROPHILS NFR BLD AUTO: 50.8 %
NITRITE URINE: NEGATIVE
NONHDLC SERPL-MCNC: 98 MG/DL
PH URINE: 6
PLATELET # BLD AUTO: 173 K/UL
POTASSIUM SERPL-SCNC: 4.7 MMOL/L
PROT SERPL-MCNC: 7.2 G/DL
PROTEIN URINE: NEGATIVE
RBC # BLD: 5.28 M/UL
RBC # FLD: 13.5 %
RED BLOOD CELLS URINE: 1 /HPF
SODIUM SERPL-SCNC: 139 MMOL/L
SPECIFIC GRAVITY URINE: 1.01
SQUAMOUS EPITHELIAL CELLS: 0 /HPF
TRIGL SERPL-MCNC: 256 MG/DL
TSH SERPL-ACNC: 0.87 UIU/ML
UROBILINOGEN URINE: NORMAL
WBC # FLD AUTO: 5.19 K/UL
WHITE BLOOD CELLS URINE: 1 /HPF

## 2023-01-20 PROCEDURE — 74177 CT ABD & PELVIS W/CONTRAST: CPT | Mod: 26,MH

## 2023-01-20 PROCEDURE — 74177 CT ABD & PELVIS W/CONTRAST: CPT

## 2023-01-23 LAB — BACTERIA UR CULT: NORMAL

## 2023-05-16 ENCOUNTER — OFFICE (OUTPATIENT)
Dept: URBAN - METROPOLITAN AREA CLINIC 12 | Facility: CLINIC | Age: 73
Setting detail: OPHTHALMOLOGY
End: 2023-05-16
Payer: MEDICARE

## 2023-05-16 DIAGNOSIS — H43.812: ICD-10-CM

## 2023-05-16 DIAGNOSIS — H35.373: ICD-10-CM

## 2023-05-16 DIAGNOSIS — H26.493: ICD-10-CM

## 2023-05-16 DIAGNOSIS — H43.21: ICD-10-CM

## 2023-05-16 DIAGNOSIS — E11.9: ICD-10-CM

## 2023-05-16 PROCEDURE — 92014 COMPRE OPH EXAM EST PT 1/>: CPT | Performed by: OPTOMETRIST

## 2023-05-16 PROCEDURE — 92250 FUNDUS PHOTOGRAPHY W/I&R: CPT | Performed by: OPTOMETRIST

## 2023-05-16 ASSESSMENT — KERATOMETRY
OD_AXISANGLE_DEGREES: 028
OD_K2POWER_DIOPTERS: 39.75
OD_K1POWER_DIOPTERS: 39.00
OS_K1POWER_DIOPTERS: 37.75
OS_AXISANGLE_DEGREES: 179
OS_K2POWER_DIOPTERS: 38.75

## 2023-05-16 ASSESSMENT — REFRACTION_AUTOREFRACTION
OS_CYLINDER: -1.25
OD_AXIS: 114
OD_CYLINDER: -0.50
OS_AXIS: 088
OS_SPHERE: +0.25
OD_SPHERE: +0.25

## 2023-05-16 ASSESSMENT — CONFRONTATIONAL VISUAL FIELD TEST (CVF)
OD_FINDINGS: FULL
OS_FINDINGS: FULL

## 2023-05-16 ASSESSMENT — SPHEQUIV_DERIVED
OD_SPHEQUIV: 0
OS_SPHEQUIV: -0.375

## 2023-05-16 ASSESSMENT — VISUAL ACUITY
OD_BCVA: 20/30-1
OS_BCVA: 20/20-1

## 2023-05-16 ASSESSMENT — LID EXAM ASSESSMENTS
OD_BLEPHARITIS: 1+
OS_BLEPHARITIS: 1+

## 2023-05-16 ASSESSMENT — AXIALLENGTH_DERIVED
OS_AL: 25.8664
OD_AL: 25.211

## 2023-05-31 ENCOUNTER — APPOINTMENT (OUTPATIENT)
Dept: ORTHOPEDIC SURGERY | Facility: CLINIC | Age: 73
End: 2023-05-31

## 2023-06-06 ENCOUNTER — FORM ENCOUNTER (OUTPATIENT)
Age: 73
End: 2023-06-06

## 2023-06-07 ENCOUNTER — APPOINTMENT (OUTPATIENT)
Dept: MRI IMAGING | Facility: CLINIC | Age: 73
End: 2023-06-07
Payer: MEDICARE

## 2023-06-07 ENCOUNTER — APPOINTMENT (OUTPATIENT)
Dept: ORTHOPEDIC SURGERY | Facility: CLINIC | Age: 73
End: 2023-06-07
Payer: MEDICARE

## 2023-06-07 VITALS — BODY MASS INDEX: 26.66 KG/M2 | HEIGHT: 69 IN | WEIGHT: 180 LBS

## 2023-06-07 PROCEDURE — 73221 MRI JOINT UPR EXTREM W/O DYE: CPT | Mod: RT,MH

## 2023-06-07 PROCEDURE — 73030 X-RAY EXAM OF SHOULDER: CPT | Mod: RT

## 2023-06-07 PROCEDURE — 99214 OFFICE O/P EST MOD 30 MIN: CPT

## 2023-06-07 NOTE — DISCUSSION/SUMMARY
[de-identified] : Reviewed all images with patient.\par MRI of the right shoulder to eval RTC tear.\par Patient has tried anti-inflammatories and rest, with no improvement and cannot sleep due to pain and was a traumatic event while doing yard work. \par Rx Mobic \par Follow up after MRI scan.\par \par \par \par -----------------------------------------------\par Home Exercise\par The patient is instructed on a home exercise program.\par \par SABRINA LEE Acting as a Scribe for Dr. Stewart\par I, Sabrina Lee, attest that this documentation has been prepared under the direction and in the presence of Provider Chapo Stewart MD.\par \par Activity Modification\par The patient was advised to modify their activities.\par \par Dx / Natural History\par The patient was advised of the diagnosis.  The natural history of the pathology was explained in full to the patient in layman's terms.  Several different treatment options were discussed and explained in full to the patient including the risks and benefits of both surgical and non-surgical treatments.  All questions and concerns were answered.\par \par Pain Guide Activities\par The patient was advised to let pain guide the gradual advancement of activities.\par \par RICE\par I explained to the patient that rest, ice, compression, and elevation would benefit them.  They may return to activity after follow-up or when they no longer have any pain.\par \par The patient's current medication management of their orthopedic diagnosis was reviewed today:\par (1) We discussed a comprehensive treatment plan that included possible pharmaceutical management involving the use of prescription strength medications including but not limited to options such as oral Naprosyn 500mg BID, once daily Meloxicam 15 mg, or 500-650 mg Tylenol versus over the counter oral medications and topical prescription NSAID Pennsaid vs over the counter Voltaren gel.\par (2) There is a moderate risk of morbidity with further treatment, especially from use of prescription strength medications and possible side effects of these medications which include upset stomach with oral medications, skin reactions to topical medications and cardiac/renal issues with long term use.\par (3) I recommended that the patient follow-up with their medical physician to discuss any significant specific potential issues with long term medication use such as interactions with current medications or with exacerbation of underlying medical comorbidities.\par (4) The benefits and risks associated with use of injectable, oral or topical, prescription and over the counter anti-inflammatory medications were discussed with the patient. The patient voiced understanding of the risks including but not limited to bleeding, stroke, kidney dysfunction, heart disease, and were referred to the black box warning label for further information.

## 2023-06-07 NOTE — HISTORY OF PRESENT ILLNESS
[de-identified] : The patient is a 72 year old right hand dominant male who presents today complaining of right shoulder pain.   \par Date of Injury/Onset: 5/27/23\par Pain:    At Rest: 1/10  \par With Activity:  10/10  \par Mechanism of injury: Patient reports planting while he was placing a post digger his shoulder jerked\par This is NOT a Work Related Injury being treated under Worker's Compensation. \par This is NOT an athletic injury occurring associated with an interscholastic or organized sports team. \par Quality of symptoms:  Sharp/ Aching \par Improves with: Rest\par Worse with: Lifting \par Prior treatment: None\par Prior Imaging: None\par Out of work/sport: n/a\par School/Sport/Position/Occupation: Semi-retired \par Additional Information: None\par

## 2023-06-07 NOTE — PHYSICAL EXAM
[de-identified] : Neurovascularly intact distally \par \par Right Shoulder: \par +Impingement test\par +Neer test\par +Potts test\par +Elvin sign\par 4-/5 Supraspinatus Strength\par lateral tenderness\par ff 110 \par \par

## 2023-06-09 ENCOUNTER — APPOINTMENT (OUTPATIENT)
Dept: ORTHOPEDIC SURGERY | Facility: CLINIC | Age: 73
End: 2023-06-09
Payer: MEDICARE

## 2023-06-09 VITALS — BODY MASS INDEX: 26.66 KG/M2 | WEIGHT: 180 LBS | HEIGHT: 69 IN

## 2023-06-09 PROCEDURE — 99214 OFFICE O/P EST MOD 30 MIN: CPT

## 2023-06-09 PROCEDURE — L3670: CPT | Mod: RT

## 2023-06-09 NOTE — PHYSICAL EXAM
[de-identified] : Neurovascularly intact distally \par \par Right Shoulder: \par +Impingement test\par +Neer test\par +Potts test\par +Elvni sign\par 4-/5 Supraspinatus Strength\par lateral tenderness\par ff 110 \par \par

## 2023-06-09 NOTE — DISCUSSION/SUMMARY
[de-identified] : sling provided.\par \par **note for driving provided.\par \par Surgical Consent:\par \par -Conservative treatment, nontreatment, nonsurgical intervention and surgical intervention treatment options have been reviewed with the patient.  The patient continues to be symptomatic [and has failed conservative treatment], and elects to move forward with surgical intervention.  The patient is indicated for RIGHT SHOULDER ARTHROSCOPIC ROTATOR CUFF REPAIR, DEBRIDEMENT, SUBACROMIAL DECOMPRESSION, AUGMENTATION WITH TAPESTRY ALLOGRAFT and all indicated procedures. As such the alternatives, benefits and risks, of the above procedure, including but not limited to bleeding, infection, neurovascular injury, loss of limb, loss of life,  DVT, PE, RSD, inability to return to previous level of activity, inability to return to previous level of employment, advancement of or to osteoarthritic changes, joint instability or motion loss, hardware failure or migration, [malunion or nonunion,] failure to resolve all symptoms, failure to return to sports and need for further procedures, as well as specific risk of [none] were discussed with the patient and/or their legal guardian who agreed to move forward with surgical intervention.  They have reviewed and signed the consent form today after expressing understanding of the above documented conversation. The patient or their representative will contact my office as instructed on the preoperative instruction sheet they received today to schedule surgery in a timely manner as discussed.\par \par -As a post-operative protocol, I am prescribing an iceless cold/heat compression therapy device for at home use to be used 3-5 times per day at 40 degrees for 35 days as an alternative to pain medication. I would like my patient to begin with simultaneous cold & compression therapy at 10mm pressure. At the patients follow up I will determine whether they should continue with cold, or if they should transition to contrast cold/heat compression therapy. Unlike a conventional cold therapy unit that requires ice, the ThermX iceless device is set to a prescribed temperature that it will remain throughout the entire duration of use, whether that be cold compression, heat compression, or contrast compression. Cold therapy units that depend on ice melt over a very short period and do not provide compression which limits the compliance and effectiveness for pain/inflammation reduction that I am targeting for my patient. I have reached out to [facility] to supply this device as they are the exclusive provider of the ThermX and the patient will be contacted and instructed on how to utilize the device.\par ------------\par \par \par -----------------------------------------------\par Home Exercise\par The patient is instructed on a home exercise program.\par \par MIKI NORRIS Acting as a Scribe for Dr. Stewart\par I, Miki Norris, attest that this documentation has been prepared under the direction and in the presence of Provider Chapo Stewart MD.\par \par Activity Modification\par The patient was advised to modify their activities.\par \par Dx / Natural History\par The patient was advised of the diagnosis.  The natural history of the pathology was explained in full to the patient in layman's terms.  Several different treatment options were discussed and explained in full to the patient including the risks and benefits of both surgical and non-surgical treatments.  All questions and concerns were answered.\par \par Pain Guide Activities\par The patient was advised to let pain guide the gradual advancement of activities.\par \par RICE\par I explained to the patient that rest, ice, compression, and elevation would benefit them.  They may return to activity after follow-up or when they no longer have any pain.\par \par The patient's current medication management of their orthopedic diagnosis was reviewed today:\par (1) We discussed a comprehensive treatment plan that included possible pharmaceutical management involving the use of prescription strength medications including but not limited to options such as oral Naprosyn 500mg BID, once daily Meloxicam 15 mg, or 500-650 mg Tylenol versus over the counter oral medications and topical prescription NSAID Pennsaid vs over the counter Voltaren gel.\par (2) There is a moderate risk of morbidity with further treatment, especially from use of prescription strength medications and possible side effects of these medications which include upset stomach with oral medications, skin reactions to topical medications and cardiac/renal issues with long term use.\par (3) I recommended that the patient follow-up with their medical physician to discuss any significant specific potential issues with long term medication use such as interactions with current medications or with exacerbation of underlying medical comorbidities.\par (4) The benefits and risks associated with use of injectable, oral or topical, prescription and over the counter anti-inflammatory medications were discussed with the patient. The patient voiced understanding of the risks including but not limited to bleeding, stroke, kidney dysfunction, heart disease, and were referred to the black box warning label for further information.\par \par \par

## 2023-06-09 NOTE — HISTORY OF PRESENT ILLNESS
[de-identified] : The patient is a 72 year old right hand dominant male who presents today complaining of right shoulder pain, STAT MRI FUV. \par Date of Injury/Onset: 5/27/23\par Pain: At Rest: 1/10 \par With Activity: 10/10 \par Mechanism of injury: Patient reports planting while he was placing a post digger his shoulder jerked\par This is NOT a Work Related Injury being treated under Worker's Compensation. \par This is NOT an athletic injury occurring associated with an interscholastic or organized sports team. \par Quality of symptoms: Sharp/ Aching \par Improves with: Rest\par Worse with: Lifting \par Prior treatment: None\par Prior Imaging: XR, MRI \par Out of work/sport: n/a\par School/Sport/Position/Occupation: Semi-retired \par Additional Information: None

## 2023-06-09 NOTE — DATA REVIEWED
[FreeTextEntry1] : 6/7/23\par [facility] MRI of the Right Shoulder:\par 1. Full-thickness tears of the supraspinatus and infraspinatus tendons with approximately 4-5 cm of retraction at \par the apex, severe surrounding bursitis, and mild associated muscle atrophy with superior displacement of the \par humeral head and narrowing of the supraspinatus outlet.\par 2. Moderate grade partial tearing of the subscapularis tendon insertion.\par 3. Moderate biceps tenosynovitis with subluxation medially.\par 4. Extensive labral tearing with moderate effusion, synovitis and capsulitis.\par 5. AC joint arthrosis and lateral acromial bone spurs.

## 2023-06-19 ENCOUNTER — APPOINTMENT (OUTPATIENT)
Dept: ORTHOPEDIC SURGERY | Facility: CLINIC | Age: 73
End: 2023-06-19

## 2023-07-02 ENCOUNTER — NON-APPOINTMENT (OUTPATIENT)
Age: 73
End: 2023-07-02

## 2023-07-12 ENCOUNTER — APPOINTMENT (OUTPATIENT)
Dept: INTERNAL MEDICINE | Facility: CLINIC | Age: 73
End: 2023-07-12

## 2023-07-13 ENCOUNTER — APPOINTMENT (OUTPATIENT)
Dept: FAMILY MEDICINE | Facility: CLINIC | Age: 73
End: 2023-07-13
Payer: MEDICARE

## 2023-07-13 VITALS
OXYGEN SATURATION: 98 % | BODY MASS INDEX: 26.81 KG/M2 | HEIGHT: 69 IN | WEIGHT: 181 LBS | SYSTOLIC BLOOD PRESSURE: 139 MMHG | HEART RATE: 79 BPM | DIASTOLIC BLOOD PRESSURE: 83 MMHG

## 2023-07-13 PROCEDURE — 99214 OFFICE O/P EST MOD 30 MIN: CPT

## 2023-07-13 RX ORDER — MELOXICAM 15 MG/1
15 TABLET ORAL DAILY
Qty: 30 | Refills: 0 | Status: DISCONTINUED | COMMUNITY
Start: 2023-06-07 | End: 2023-07-13

## 2023-07-21 ENCOUNTER — APPOINTMENT (OUTPATIENT)
Dept: ORTHOPEDIC SURGERY | Facility: HOSPITAL | Age: 73
End: 2023-07-21
Payer: MEDICARE

## 2023-07-21 PROCEDURE — 29826 SHO ARTHRS SRG DECOMPRESSION: CPT | Mod: RT

## 2023-07-21 PROCEDURE — 29827 SHO ARTHRS SRG RT8TR CUF RPR: CPT | Mod: RT

## 2023-07-21 PROCEDURE — 29823 SHO ARTHRS SRG XTNSV DBRDMT: CPT | Mod: RT

## 2023-07-21 PROCEDURE — 29828 SHO ARTHRS SRG BICP TENODSIS: CPT | Mod: RT

## 2023-07-31 ENCOUNTER — APPOINTMENT (OUTPATIENT)
Dept: ORTHOPEDIC SURGERY | Facility: CLINIC | Age: 73
End: 2023-07-31
Payer: MEDICARE

## 2023-07-31 VITALS — HEIGHT: 69 IN | WEIGHT: 181 LBS | BODY MASS INDEX: 26.81 KG/M2

## 2023-07-31 PROCEDURE — 99024 POSTOP FOLLOW-UP VISIT: CPT

## 2023-07-31 NOTE — DISCUSSION/SUMMARY
[de-identified] : Reviewed all images and provided copies to patient. Physical therapy prescribed for strengthening and stretching. Patient will follow up in 3 weeks, email provided.   Aug 22nd, going on cruise to [place].  ----------------------------------------------- Home Exercise The patient is instructed on a home exercise program.  SABRINA LEE Acting as a Scribe for Dr. Terry GIRARD, Sabrian Lee, attest that this documentation has been prepared under the direction and in the presence of Provider Chapo Stewart MD.  Activity Modification The patient was advised to modify their activities.  Dx / Natural History The patient was advised of the diagnosis.  The natural history of the pathology was explained in full to the patient in layman's terms.  Several different treatment options were discussed and explained in full to the patient including the risks and benefits of both surgical and non-surgical treatments.  All questions and concerns were answered.  Pain Guide Activities The patient was advised to let pain guide the gradual advancement of activities.  PARMJIT GIRARD explained to the patient that rest, ice, compression, and elevation would benefit them.  They may return to activity after follow-up or when they no longer have any pain.  The patient's current medication management of their orthopedic diagnosis was reviewed today: (1) We discussed a comprehensive treatment plan that included possible pharmaceutical management involving the use of prescription strength medications including but not limited to options such as oral Naprosyn 500mg BID, once daily Meloxicam 15 mg, or 500-650 mg Tylenol versus over the counter oral medications and topical prescription NSAID Pennsaid vs over the counter Voltaren gel. (2) There is a moderate risk of morbidity with further treatment, especially from use of prescription strength medications and possible side effects of these medications which include upset stomach with oral medications, skin reactions to topical medications and cardiac/renal issues with long term use. (3) I recommended that the patient follow-up with their medical physician to discuss any significant specific potential issues with long term medication use such as interactions with current medications or with exacerbation of underlying medical comorbidities. (4) The benefits and risks associated with use of injectable, oral or topical, prescription and over the counter anti-inflammatory medications were discussed with the patient. The patient voiced understanding of the risks including but not limited to bleeding, stroke, kidney dysfunction, heart disease, and were referred to the black box warning label for further information.

## 2023-07-31 NOTE — HISTORY OF PRESENT ILLNESS
[de-identified] : The patient is s/p R shoulder arthroscopic RTC repair, arthroscopic PBT, extensive debridement, subacromial decompression with acromioplasty and biologic augmentation with tapestry allograft.  Date of Surgery: 07/21/23 Pain:    At Rest: 3/10  With Activity:  5/10  Mechanism of injury: Patient reports planting while he was placing a post digger his shoulder jerked This is NOT a Work Related Injury being treated under Worker's Compensation. This is NOT an athletic injury occurring associated with an interscholastic or organized sports team. Treatment/Imaging/Studies Since Last Visit: Sx 	Reports Available For Review Today: Sx photos Out of work/sport: _, since _ School/Sport/Position/Occupation: Semi-retired  Change since last visit: Sx Additional Information: None

## 2023-08-16 ENCOUNTER — APPOINTMENT (OUTPATIENT)
Dept: ORTHOPEDIC SURGERY | Facility: CLINIC | Age: 73
End: 2023-08-16
Payer: MEDICARE

## 2023-08-16 VITALS — WEIGHT: 181 LBS | BODY MASS INDEX: 26.81 KG/M2 | HEIGHT: 69 IN

## 2023-08-16 PROCEDURE — 99024 POSTOP FOLLOW-UP VISIT: CPT

## 2023-08-16 NOTE — DATA REVIEWED
[FreeTextEntry1] : 6/7/23 [facility] MRI of the Right Shoulder: 1. Full-thickness tears of the supraspinatus and infraspinatus tendons with approximately 4-5 cm of retraction at the apex, severe surrounding bursitis, and mild associated muscle atrophy with superior displacement of the humeral head and narrowing of the supraspinatus outlet. 2. Moderate grade partial tearing of the subscapularis tendon insertion. 3. Moderate biceps tenosynovitis with subluxation medially. 4. Extensive labral tearing with moderate effusion, synovitis and capsulitis. 5. AC joint arthrosis and lateral acromial bone spurs.

## 2023-08-16 NOTE — PHYSICAL EXAM
[de-identified] : Neurovascularly intact distally  Right Shoulder: No effusion, clean and dry incisions, intact skin, no fluctuance, no sign of infection, no wound erythema, no induration, no drainage, sutures removed, steri-strips applied.

## 2023-08-16 NOTE — HISTORY OF PRESENT ILLNESS
[de-identified] : The patient is s/p R shoulder arthroscopic RTC repair, arthroscopic PBT, extensive debridement, subacromial decompression with acromioplasty and biologic augmentation with tapestry allograft.  Date of Surgery: 07/21/23 Pain:    At Rest: 0/10  With Activity:  0/10  Mechanism of injury: Patient reports planting while he was placing a post digger his shoulder jerked This is NOT a Work Related Injury being treated under Worker's Compensation. This is NOT an athletic injury occurring associated with an interscholastic or organized sports team. Treatment/Imaging/Studies Since Last Visit: Sx, ultasling, HEP  	Reports Available For Review Today: n/a Out of work/sport: _, since _ School/Sport/Position/Occupation: Semi-retired  Change since last visit: Max Additional Information: None

## 2023-08-30 ENCOUNTER — NON-APPOINTMENT (OUTPATIENT)
Age: 73
End: 2023-08-30

## 2023-09-06 ENCOUNTER — APPOINTMENT (OUTPATIENT)
Dept: ORTHOPEDIC SURGERY | Facility: CLINIC | Age: 73
End: 2023-09-06

## 2023-10-02 ENCOUNTER — APPOINTMENT (OUTPATIENT)
Dept: ORTHOPEDIC SURGERY | Facility: CLINIC | Age: 73
End: 2023-10-02
Payer: MEDICARE

## 2023-10-02 VITALS — BODY MASS INDEX: 26.81 KG/M2 | WEIGHT: 181 LBS | HEIGHT: 69 IN

## 2023-10-02 PROCEDURE — 20611 DRAIN/INJ JOINT/BURSA W/US: CPT | Mod: 79,LT

## 2023-10-02 PROCEDURE — 73030 X-RAY EXAM OF SHOULDER: CPT | Mod: LT

## 2023-10-02 PROCEDURE — 99214 OFFICE O/P EST MOD 30 MIN: CPT | Mod: 25,24

## 2023-11-13 ENCOUNTER — APPOINTMENT (OUTPATIENT)
Dept: ORTHOPEDIC SURGERY | Facility: CLINIC | Age: 73
End: 2023-11-13

## 2024-01-03 ENCOUNTER — APPOINTMENT (OUTPATIENT)
Dept: ORTHOPEDIC SURGERY | Facility: CLINIC | Age: 74
End: 2024-01-03
Payer: MEDICARE

## 2024-01-03 VITALS — HEIGHT: 69 IN | BODY MASS INDEX: 26.81 KG/M2 | WEIGHT: 181 LBS

## 2024-01-03 PROCEDURE — 99214 OFFICE O/P EST MOD 30 MIN: CPT

## 2024-01-03 NOTE — HISTORY OF PRESENT ILLNESS
[de-identified] : The patient is a 73 year old [RIGHT/LEFT] hand dominant male who presents today complaining of LT Shoulder pain.   Date of Injury/Onset: 2 weeks ago Pain:    At Rest: 4/10  With Activity:  7/10  Mechanism of injury: no injury This is NOT a Work Related Injury being treated under Worker's Compensation. This is NOT an athletic injury occurring associated with an interscholastic or organized sports team. Quality of symptoms: Dull ache down the arm Improves with: rest Worse with: reaching above his head Prior treatment: none Prior Imaging: none Out of work/sport: _, since _ School/Sport/Position/Occupation:_retired Additional Information: None

## 2024-01-03 NOTE — PHYSICAL EXAM
[de-identified] : Neurologic: normal sensation, normal mood and affect, orientated and able to communicate  Left Shoulder: +Impingement test +Neer test +Potts test +Elvin sign 4-/5 Supraspinatus Strength  Right Shoulder: passive FF: 95

## 2024-01-03 NOTE — DISCUSSION/SUMMARY
[de-identified] : Patient was on vacation for two weeks and was not able to go to physical therapy. Patient complains of lack of range of motion in the right shoulder. MRI of right shoulder to eval RTC repair tear.  Patient was given prescription of formal physical therapy for strengthening and stretching.  Follow up after MRI scan.    ----------------------------------------------- Home Exercise The patient is instructed on a home exercise program.  SABRINA LEE Acting as a Scribe for Dr. Terry GIRARD, Sabrina Lee, attest that this documentation has been prepared under the direction and in the presence of Provider Chapo Stewart MD.  Activity Modification The patient was advised to modify their activities.  Dx / Natural History The patient was advised of the diagnosis.  The natural history of the pathology was explained in full to the patient in layman's terms.  Several different treatment options were discussed and explained in full to the patient including the risks and benefits of both surgical and non-surgical treatments.  All questions and concerns were answered.  Pain Guide Activities The patient was advised to let pain guide the gradual advancement of activities.  PARMJIT GIRARD explained to the patient that rest, ice, compression, and elevation would benefit them.  They may return to activity after follow-up or when they no longer have any pain.  The patient's current medication management of their orthopedic diagnosis was reviewed today: (1) We discussed a comprehensive treatment plan that included possible pharmaceutical management involving the use of prescription strength medications including but not limited to options such as oral Naprosyn 500mg BID, once daily Meloxicam 15 mg, or 500-650 mg Tylenol versus over the counter oral medications and topical prescription NSAID Pennsaid vs over the counter Voltaren gel. (2) There is a moderate risk of morbidity with further treatment, especially from use of prescription strength medications and possible side effects of these medications which include upset stomach with oral medications, skin reactions to topical medications and cardiac/renal issues with long term use. (3) I recommended that the patient follow-up with their medical physician to discuss any significant specific potential issues with long term medication use such as interactions with current medications or with exacerbation of underlying medical comorbidities. (4) The benefits and risks associated with use of injectable, oral or topical, prescription and over the counter anti-inflammatory medications were discussed with the patient. The patient voiced understanding of the risks including but not limited to bleeding, stroke, kidney dysfunction, heart disease, and were referred to the black box warning label for further information.

## 2024-01-05 ENCOUNTER — APPOINTMENT (OUTPATIENT)
Dept: MRI IMAGING | Facility: CLINIC | Age: 74
End: 2024-01-05
Payer: MEDICARE

## 2024-01-05 ENCOUNTER — APPOINTMENT (OUTPATIENT)
Dept: MRI IMAGING | Facility: CLINIC | Age: 74
End: 2024-01-05

## 2024-01-05 PROCEDURE — 73221 MRI JOINT UPR EXTREM W/O DYE: CPT | Mod: RT,MH

## 2024-01-10 ENCOUNTER — APPOINTMENT (OUTPATIENT)
Dept: ORTHOPEDIC SURGERY | Facility: CLINIC | Age: 74
End: 2024-01-10
Payer: MEDICARE

## 2024-01-10 VITALS — WEIGHT: 181 LBS | BODY MASS INDEX: 26.81 KG/M2 | HEIGHT: 69 IN

## 2024-01-10 PROCEDURE — 99214 OFFICE O/P EST MOD 30 MIN: CPT

## 2024-01-10 NOTE — DISCUSSION/SUMMARY
[de-identified] : Reviewed all images with patient. All questions answered. Interpretation provided. MRI of the left shoulder to eval  rotator cuff tear Follow up with Dr rollins  to discuss possible surgical intervention for his right shoulder      ----------------------------------------------- Home Exercise The patient is instructed on a home exercise program.  MIKI NORRIS Acting as a Scribe for Dr. Terry GIRARD, Miki Norris, attest that this documentation has been prepared under the direction and in the presence of Provider Chapo Stewart MD.  Activity Modification The patient was advised to modify their activities.  Dx / Natural History The patient was advised of the diagnosis.  The natural history of the pathology was explained in full to the patient in layman's terms.  Several different treatment options were discussed and explained in full to the patient including the risks and benefits of both surgical and non-surgical treatments.  All questions and concerns were answered.  Pain Guide Activities The patient was advised to let pain guide the gradual advancement of activities.  PARMJIT GIRARD explained to the patient that rest, ice, compression, and elevation would benefit them.  They may return to activity after follow-up or when they no longer have any pain.  The patient's current medication management of their orthopedic diagnosis was reviewed today: (1) We discussed a comprehensive treatment plan that included possible pharmaceutical management involving the use of prescription strength medications including but not limited to options such as oral Naprosyn 500mg BID, once daily Meloxicam 15 mg, or 500-650 mg Tylenol versus over the counter oral medications and topical prescription NSAID Pennsaid vs over the counter Voltaren gel. (2) There is a moderate risk of morbidity with further treatment, especially from use of prescription strength medications and possible side effects of these medications which include upset stomach with oral medications, skin reactions to topical medications and cardiac/renal issues with long term use. (3) I recommended that the patient follow-up with their medical physician to discuss any significant specific potential issues with long term medication use such as interactions with current medications or with exacerbation of underlying medical comorbidities. (4) The benefits and risks associated with use of injectable, oral or topical, prescription and over the counter anti-inflammatory medications were discussed with the patient. The patient voiced understanding of the risks including but not limited to bleeding, stroke, kidney dysfunction, heart disease, and were referred to the black box warning label for further information.

## 2024-01-10 NOTE — PHYSICAL EXAM
[de-identified] : Neurologic: normal sensation, normal mood and affect, orientated and able to communicate  Left Shoulder: +Impingement test +Neer test +Potts test +Elvin sign 4-/5 Supraspinatus Strength  Right Shoulder: passive FF: 95

## 2024-01-10 NOTE — HISTORY OF PRESENT ILLNESS
[de-identified] : The patient is a 73 year old [RIGHT] hand dominant male who presents today complaining of RT Shoulder pain.   Date of Injury/Onset: 2 weeks ago Pain:    At Rest: 4/10  With Activity:  7/10  Mechanism of injury: no injury This is NOT a Work Related Injury being treated under Worker's Compensation. This is NOT an athletic injury occurring associated with an interscholastic or organized sports team. Quality of symptoms: Dull ache down the arm Improves with: rest Worse with: reaching above his head Treatment/Imaging since last visit: MRI ([facility]) Out of work/sport: _, since _ School/Sport/Position/Occupation:_retired Changes since last visit: MRI ([facility]), Additional Information: pt c.o left shoulder pain that is starting again, last CSI 10/2/23

## 2024-01-22 ENCOUNTER — APPOINTMENT (OUTPATIENT)
Dept: MRI IMAGING | Facility: CLINIC | Age: 74
End: 2024-01-22
Payer: MEDICARE

## 2024-01-22 PROCEDURE — 73221 MRI JOINT UPR EXTREM W/O DYE: CPT | Mod: LT,MH

## 2024-01-29 ENCOUNTER — APPOINTMENT (OUTPATIENT)
Dept: ORTHOPEDIC SURGERY | Facility: CLINIC | Age: 74
End: 2024-01-29
Payer: MEDICARE

## 2024-01-29 PROCEDURE — 99214 OFFICE O/P EST MOD 30 MIN: CPT

## 2024-01-29 NOTE — DATA REVIEWED
[FreeTextEntry1] : MRI left shoulder 1/22/24 [facility] Full thickness U- shaped configured tear of the supraspinatus tendon insertion with up to 3 cm of retraction without muscle atrophy

## 2024-01-29 NOTE — HISTORY OF PRESENT ILLNESS
[de-identified] : This is a 72yo male presenting to the office c/o ongoing left shoulder pain for many months. Patient has an MRI for review.  Patient had a right RCR done by Dr. Stewart in July 2023. Pain is described as constant, severe in quality. Currently pain is 6/10 and non-radiating. Patient reports pain has been getting progressively worse. Pain is worse at night. Overall pain does improve with rest and ice. Patient denies any numbness or tingling.

## 2024-01-29 NOTE — DISCUSSION/SUMMARY
[de-identified] : This is a 74yo male presenting to the office c/o ongoing left shoulder pain for many months MRI demonstrates full thickness U- shaped configured tear of the supraspinatus tendon insertion with up to 3 cm of retraction without muscle atrophy Demonstrated supine FF to maximize range of motion  Follow up 1 month for revaluation   Regarding the right shoulder, patient has retear and atrophy and can actively forward elevate to 90 degrees. He does have good isolated external rotation strength at 0 degrees of abduction. I recommended focusing on supine forward flexion active and active assist range of motion He would ultimately be a candidate for reverse shoulder replacement if he is not making meaningful improvement after 8 months postop.  With respect to the left shoulder, patient has a 3 cm U-shaped tear with some delamination and bursitis. At this time he is not having significant pain or difficulty.  We discussed the possibility of repair versus replacement at some point in the future if he is not satisfied with nonsurgical treatment.  (1) We discussed a comprehensive treatment plans that included a prescription management plan involving the use of prescription strength medications to include Ibuprofen 600-800 mg TID, versus 500-650 mg Tylenol. We also discussed prescribing topical diclofenac (Voltaren gel) as well as once daily Meloxicam 15 mg. (2) The patient has More Than One chronic injuries/illnesses as outlined, discussed, and documented by ICD 10 codes listed, as well as the HPI and Plan section. There is a moderate risk of morbidity with further treatment, especially from use of prescription strength medications and possible side effects of these medications which include upset stomach and cardiac/renal issues with long term use were discussed. (3) I recommended that the patient follow-up with their medical physician to discuss any significant specific potential issues with long term use such as interactions with current medications or with exacerbation of underlying medical morbidities.   Attestation: I, Shoshana Fernandez , attest that this documentation has been prepared under the direction and in the presence of Provider Bon Gambino MD. The documentation recorded by the scribe, in my presence, accurately reflects the service I personally performed, and the decisions made by me with my edits as appropriate. Bon Gambino MD

## 2024-01-29 NOTE — PHYSICAL EXAM
[de-identified] : Constitutional: The general appearance of the patient is well developed, well nourished, no deformities and well groomed. Normal  Gait: Gait and function is as follows: normal gait.  Skin: Head and neck visualized skin is normal. Left upper extremity visualized skin is normal. Right upper extremity visualized skin is normal. Thoracic Skin of the thoracic spine shows visualized skin is normal.  Cardiovascular: palpable radial pulse bilaterally, good capillary refill in digits of the bilateral upper extremities and no temperature or color changes in the bilateral upper extremities.  Lymphatic: Normal Palpation of lymph nodes in the cervical.  Neurologic: fine motor control in the bilateral upper extremities is intact. Deep Tendon Reflexes in Upper and Lower Extremities Negative Bravo's in the bilateral upper extremities. The patient is oriented to time, place and person. Sensation to light touch intact in the bilateral upper extremities. Mood and Affect is normal.  Right Shoulder: Inspection of the shoulder/upper arm is as follows: There is a full, pain-free, stable range of motion of the shoulder with normal strength and no tenderness to palpation.  Left Shoulder: Inspection of the shoulder/upper arm is as follows: no scapula winging, no biceps deformity and no AC joint deformity. Palpation of the shoulder/upper arm is as follows: There is tenderness at the proximal biceps tendon. Range of motion of the shoulder is as follows: Pain with internal rotation, external rotation, abduction and forward flexion. Strength of the shoulder is as follows: Supraspinatus 4/5. External Rotation 4/5. Internal Rotation 4/5. Deltoid 5/5 Ligament Stability and Special Tests of the shoulder is as follows: Neer test is positive. Potts' test is positive. Speed's test is positive.  Neck:  Inspection / Palpation of the cervical spine is as follows: mild paracervical tenderness. Range of motion of the cervical spine is as follows: moderately decreased range of motion of the cervical spine. Stability testing for the cervical spine is as follows Stable range of motion.  Back, including spine: Inspection / Palpation of the thoracic/lumbar spine is as follows: There is a full, pain free, stable range of motion of the thoracic spine with a normal tone and not tenderness to palpation..

## 2024-02-27 ENCOUNTER — APPOINTMENT (OUTPATIENT)
Dept: FAMILY MEDICINE | Facility: CLINIC | Age: 74
End: 2024-02-27
Payer: MEDICARE

## 2024-02-27 ENCOUNTER — LABORATORY RESULT (OUTPATIENT)
Age: 74
End: 2024-02-27

## 2024-02-27 VITALS
OXYGEN SATURATION: 94 % | HEART RATE: 88 BPM | DIASTOLIC BLOOD PRESSURE: 84 MMHG | SYSTOLIC BLOOD PRESSURE: 142 MMHG | BODY MASS INDEX: 26.73 KG/M2 | TEMPERATURE: 97.2 F | WEIGHT: 181 LBS

## 2024-02-27 DIAGNOSIS — R09.82 POSTNASAL DRIP: ICD-10-CM

## 2024-02-27 DIAGNOSIS — Z12.5 ENCOUNTER FOR SCREENING FOR MALIGNANT NEOPLASM OF PROSTATE: ICD-10-CM
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
## 2024-02-27 DIAGNOSIS — E78.5 HYPERLIPIDEMIA, UNSPECIFIED: ICD-10-CM

## 2024-02-27 DIAGNOSIS — E11.9 TYPE 2 DIABETES MELLITUS W/OUT COMPLICATIONS: ICD-10-CM

## 2024-02-27 PROCEDURE — 99214 OFFICE O/P EST MOD 30 MIN: CPT

## 2024-02-27 PROCEDURE — 36415 COLL VENOUS BLD VENIPUNCTURE: CPT

## 2024-02-27 RX ORDER — IBUPROFEN 800 MG/1
800 TABLET, FILM COATED ORAL TWICE DAILY
Qty: 28 | Refills: 0 | Status: DISCONTINUED | COMMUNITY
Start: 2023-07-21 | End: 2024-02-27

## 2024-02-27 RX ORDER — DOCUSATE SODIUM 50 MG/1
50 CAPSULE, LIQUID FILLED ORAL
Qty: 21 | Refills: 0 | Status: DISCONTINUED | COMMUNITY
Start: 2023-07-21 | End: 2024-02-27

## 2024-02-27 RX ORDER — LEVOCETIRIZINE DIHYDROCHLORIDE 5 MG/1
5 TABLET ORAL DAILY
Qty: 30 | Refills: 0 | Status: ACTIVE | COMMUNITY
Start: 2024-02-27 | End: 1900-01-01

## 2024-02-27 RX ORDER — HYDROCODONE BITARTRATE AND ACETAMINOPHEN 5; 325 MG/1; MG/1
5-325 TABLET ORAL
Qty: 30 | Refills: 0 | Status: DISCONTINUED | COMMUNITY
Start: 2023-07-21 | End: 2024-02-27

## 2024-02-27 RX ORDER — OXYCODONE AND ACETAMINOPHEN 5; 325 MG/1; MG/1
5-325 TABLET ORAL EVERY 4 HOURS
Qty: 30 | Refills: 0 | Status: DISCONTINUED | COMMUNITY
Start: 2023-07-22 | End: 2024-02-27

## 2024-02-27 RX ORDER — ONDANSETRON 4 MG/1
4 TABLET, ORALLY DISINTEGRATING ORAL EVERY 8 HOURS
Qty: 12 | Refills: 0 | Status: DISCONTINUED | COMMUNITY
Start: 2023-07-21 | End: 2024-02-27

## 2024-02-27 RX ORDER — AZELASTINE HYDROCHLORIDE 137 UG/1
0.1 SPRAY, METERED NASAL DAILY
Qty: 1 | Refills: 1 | Status: ACTIVE | COMMUNITY
Start: 2024-02-27 | End: 1900-01-01

## 2024-02-27 NOTE — ASSESSMENT
[FreeTextEntry1] : pnd likely viral start  azelastine spray and levocetirizine qhs  follow up labs  if not resolved call back office will consider antibiotic

## 2024-02-27 NOTE — HISTORY OF PRESENT ILLNESS
[FreeTextEntry8] : 2 weeks cough and mucus build up back of throat worse with lying back per pt  no fever, no sore throat , no chest pain , no sob . no nasal congestion  states last summer tick bite went to urgent care given antibiotic and tried to take out tick but feels a small amount left behind - states area hard and now new skin lesion below bite

## 2024-03-01 LAB
ALBUMIN SERPL ELPH-MCNC: 4.3 G/DL
ALP BLD-CCNC: 62 U/L
ALT SERPL-CCNC: 16 U/L
ANION GAP SERPL CALC-SCNC: 13 MMOL/L
APPEARANCE: CLEAR
AST SERPL-CCNC: 18 U/L
BACTERIA: NEGATIVE /HPF
BASOPHILS # BLD AUTO: 0.05 K/UL
BASOPHILS NFR BLD AUTO: 0.8 %
BILIRUB SERPL-MCNC: 0.6 MG/DL
BILIRUBIN URINE: NEGATIVE
BLOOD URINE: NEGATIVE
BUN SERPL-MCNC: 12 MG/DL
CALCIUM SERPL-MCNC: 9.5 MG/DL
CAST: 0 /LPF
CHLORIDE SERPL-SCNC: 101 MMOL/L
CHOLEST SERPL-MCNC: 153 MG/DL
CO2 SERPL-SCNC: 22 MMOL/L
COLOR: YELLOW
CREAT SERPL-MCNC: 0.69 MG/DL
EGFR: 97 ML/MIN/1.73M2
EOSINOPHIL # BLD AUTO: 0.22 K/UL
EOSINOPHIL NFR BLD AUTO: 3.4 %
EPITHELIAL CELLS: 0 /HPF
ESTIMATED AVERAGE GLUCOSE: 137 MG/DL
GLUCOSE QUALITATIVE U: NEGATIVE MG/DL
GLUCOSE SERPL-MCNC: 149 MG/DL
HBA1C MFR BLD HPLC: 6.4 %
HCT VFR BLD CALC: 43.9 %
HDLC SERPL-MCNC: 44 MG/DL
HGB BLD-MCNC: 14.2 G/DL
IMM GRANULOCYTES NFR BLD AUTO: 0.5 %
KETONES URINE: NEGATIVE MG/DL
LDLC SERPL CALC-MCNC: 75 MG/DL
LEUKOCYTE ESTERASE URINE: NEGATIVE
LYMPHOCYTES # BLD AUTO: 1.7 K/UL
LYMPHOCYTES NFR BLD AUTO: 26.5 %
MAN DIFF?: NORMAL
MCHC RBC-ENTMCNC: 27.9 PG
MCHC RBC-ENTMCNC: 32.3 GM/DL
MCV RBC AUTO: 86.2 FL
MICROSCOPIC-UA: NORMAL
MONOCYTES # BLD AUTO: 0.64 K/UL
MONOCYTES NFR BLD AUTO: 10 %
NEUTROPHILS # BLD AUTO: 3.78 K/UL
NEUTROPHILS NFR BLD AUTO: 58.8 %
NITRITE URINE: NEGATIVE
NONHDLC SERPL-MCNC: 109 MG/DL
PH URINE: 6.5
PLATELET # BLD AUTO: 197 K/UL
POTASSIUM SERPL-SCNC: 4.3 MMOL/L
PROT SERPL-MCNC: 7.1 G/DL
PROTEIN URINE: NEGATIVE MG/DL
PSA FREE FLD-MCNC: 25 %
PSA FREE SERPL-MCNC: 0.5 NG/ML
PSA SERPL-MCNC: 1.97 NG/ML
RBC # BLD: 5.09 M/UL
RBC # FLD: 12.7 %
RED BLOOD CELLS URINE: 0 /HPF
SODIUM SERPL-SCNC: 136 MMOL/L
SPECIFIC GRAVITY URINE: 1.01
TRIGL SERPL-MCNC: 208 MG/DL
TSH SERPL-ACNC: 0.85 UIU/ML
UROBILINOGEN URINE: 0.2 MG/DL
WBC # FLD AUTO: 6.42 K/UL
WHITE BLOOD CELLS URINE: 0 /HPF

## 2024-03-11 ENCOUNTER — APPOINTMENT (OUTPATIENT)
Dept: ORTHOPEDIC SURGERY | Facility: CLINIC | Age: 74
End: 2024-03-11
Payer: MEDICARE

## 2024-03-11 DIAGNOSIS — S49.92XA UNSPECIFIED INJURY OF LEFT SHOULDER AND UPPER ARM, INITIAL ENCOUNTER: ICD-10-CM

## 2024-03-11 DIAGNOSIS — M75.22 BICIPITAL TENDINITIS, LEFT SHOULDER: ICD-10-CM

## 2024-03-11 PROCEDURE — 20611 DRAIN/INJ JOINT/BURSA W/US: CPT | Mod: LT

## 2024-03-11 PROCEDURE — 99214 OFFICE O/P EST MOD 30 MIN: CPT | Mod: 25

## 2024-03-11 NOTE — PHYSICAL EXAM
[de-identified] : Constitutional: The general appearance of the patient is well developed, well nourished, no deformities and well groomed. Normal  Gait: Gait and function is as follows: normal gait.  Skin: Head and neck visualized skin is normal. Left upper extremity visualized skin is normal. Right upper extremity visualized skin is normal. Thoracic Skin of the thoracic spine shows visualized skin is normal.  Cardiovascular: palpable radial pulse bilaterally, good capillary refill in digits of the bilateral upper extremities and no temperature or color changes in the bilateral upper extremities.  Lymphatic: Normal Palpation of lymph nodes in the cervical.  Neurologic: fine motor control in the bilateral upper extremities is intact. Deep Tendon Reflexes in Upper and Lower Extremities Negative Bravo's in the bilateral upper extremities. The patient is oriented to time, place and person. Sensation to light touch intact in the bilateral upper extremities. Mood and Affect is normal.  Right Shoulder: Inspection of the shoulder/upper arm is as follows: no scapula winging, no biceps deformity and no AC joint deformity. Palpation of the shoulder/upper arm is as follows: There is tenderness at the proximal biceps tendon. Range of motion of the shoulder is as follows: Pain with internal rotation, external rotation, abduction and forward flexion. Strength of the shoulder is as follows: Supraspinatus 4/5. External Rotation 4/5. Internal Rotation 4/5. Deltoid 5/5 Ligament Stability and Special Tests of the shoulder is as follows: Neer test is positive. Potts' test is positive. Speed's test is positive  Left Shoulder: Inspection of the shoulder/upper arm is as follows: There is a full, pain-free, stable range of motion of the shoulder with normal strength and no tenderness to palpation.  Neck:  Inspection / Palpation of the cervical spine is as follows: mild paracervical tenderness. Range of motion of the cervical spine is as follows: moderately decreased range of motion of the cervical spine. Stability testing for the cervical spine is as follows Stable range of motion.  Back, including spine: Inspection / Palpation of the thoracic/lumbar spine is as follows: There is a full, pain free, stable range of motion of the thoracic spine with a normal tone and not tenderness to palpation..

## 2024-03-11 NOTE — HISTORY OF PRESENT ILLNESS
[de-identified] : This is a 74yo male presenting to the office c/o ongoing left shoulder pain for many months. Patient has an MRI for review.  Patient had a right RCR done by Dr. Stewart in July 2023. Pain is described as constant, severe in quality. Currently pain is 6/10 and non-radiating. Patient reports pain has been getting progressively worse. Pain is worse at night. Overall pain does improve with rest and ice. Patient denies any numbness or tingling.  3/11/24: Patient here for right shoulder pain. Patient admits slow improvement in forward flexion motion. Pt is 8 months out from right RCR by Dr. Stewart. With respect to the left shoulder pt reports pain has been interfering with ADL's.

## 2024-03-11 NOTE — PROCEDURE
[FreeTextEntry3] : Large Joint Injection was performed because of pain and inflammation.  Anesthesia: ethyl chloride sprayed topically..  Depomedrol: An injection of Depomedrol 40 mg , 1 cc.  Lidocaine: 7 cc.  Medication was injected in the left subacromial space. Patient has tried OTC's including aspirin, Ibuprofen, Aleve etc or prescription NSAIDS, and/or exercises at home and/ or physical therapy without satisfactory response and Patient has decreased mobility in the joint. After verbal consent using sterile preparation and technique. The risks, benefits, and alternatives to cortisone injection were explained in full to the patient. Risks outlined include but are not limited to infection, sepsis, bleeding, scarring, skin discoloration, temporary increase in pain, syncopal episode, failure to resolve symptoms, allergic reaction, symptom recurrence, and elevation of blood sugar in diabetics. Patient understood the risks. All questions were answered. After discussion of options, patient requested an injection. Oral informed consent was obtained and sterile prep was done of the injection site. Sterile technique was utilized for the procedure including the preparation of the solutions used for the injection. Patient tolerated the procedure well. Advised to ice the injection site this evening. Prep with alcohol locally to site. Sterile technique used. Patient tolerated procedure well. Post Procedure Instructions: Patient was advised to call if redness, pain, or fever occur and apply ice for 15 min. out of every hour for the next 12-24 hours as tolerated. patient was advised to rest the joint(s) for 7 days.

## 2024-03-11 NOTE — DISCUSSION/SUMMARY
[de-identified] : RUE: AFF 90 deg, ER 30   This is a 74yo male presenting to the office c/o ongoing bilateral shoulder pain for many months MRI demonstrates full thickness U- shaped configured tear of the supraspinatus tendon insertion with up to 3 cm of retraction without muscle atrophy Follow up 2 month for revaluation   Regarding the right shoulder, patient has retear and atrophy and can actively forward elevate to 90 degrees. He does have good isolated external rotation strength at 0 degrees of abduction. I recommended focusing on supine forward flexion active and active assist range of motion Pt needs a CT scan for pre surgical planning  Surgery discussed, pt would like to proceed in the summer Pt has a significant osteoarthritis, with an intact deltoid, rotator cuff insufficiency, and inflammation to the biceps labral complex with proximal biceps tendinopathy. This has been present for more than 6 months and has not improved despite more than 3 months of conservative treatment including focused HEP/therapy, anti-inflammatory medication and activity modification. There have been no injections into the shoulder within 3 months of the indicated procedure.  The patient is having severe pain with forward flexion and/or pseudoparalysis.  The patient is interested in pursuing surgical treatment. We had a lengthy discussion about the surgery as well as the recovery. We discussed the risks which include but are not limited to infection, bleeding, need for blood transfusions, failure of treatment to alleviate all pain or improve function, the risk of injury to nerves and blood vessels.  There is also the risks inherent to anesthesia as well.  We discussed that given the patients distorted anatomy as a result of arthritis, these risks are real although every attempt will be made to mitigate these at the time of surgery. Pt understands there is no guarantee of success, however there is a high likelihood of functional improvement and pain relief. The patient understood all this was discussed.  The patient is indicated for a Right reverse shoulder arthroplasty with biceps tenodesis.   * Surgery: Considering patient has failed all conservative treatment we are requesting authorization for: -	Right shoulder Reverse Shoulder Arthroplasty (CPT 22965) with Biceps Tenodesis (CPT 59680) Pt would like surgery (June )  The patient will require a Flash Arc 2.0 brace, cryotherapy, and 12 weeks of post-operative physical therapy. The patient will require a medical clearance , (?cardiac) The doctor will require the [employer] representative, 2 hours, and one assistant.   With respect to the left shoulder, patient has a 3 cm U-shaped tear with some delamination and bursitis. Pt reports pain has been interfering with ADL's Pt was treated with a subacromial injection for pain Surgery discussed    (1) We discussed a comprehensive treatment plans that included a prescription management plan involving the use of prescription strength medications to include Ibuprofen 600-800 mg TID, versus 500-650 mg Tylenol. We also discussed prescribing topical diclofenac (Voltaren gel) as well as once daily Meloxicam 15 mg. (2) The patient has More Than One chronic injuries/illnesses as outlined, discussed, and documented by ICD 10 codes listed, as well as the HPI and Plan section. There is a moderate risk of morbidity with further treatment, especially from use of prescription strength medications and possible side effects of these medications which include upset stomach and cardiac/renal issues with long term use were discussed. (3) I recommended that the patient follow-up with their medical physician to discuss any significant specific potential issues with long term use such as interactions with current medications or with exacerbation of underlying medical morbidities.   Attestation: I, Shoshana Fernandez , attest that this documentation has been prepared under the direction and in the presence of Provider Bon Gambino MD. The documentation recorded by the scribe, in my presence, accurately reflects the service I personally performed, and the decisions made by me with my edits as appropriate. Bon Gambino MD

## 2024-03-13 ENCOUNTER — APPOINTMENT (OUTPATIENT)
Dept: ORTHOPEDIC SURGERY | Facility: CLINIC | Age: 74
End: 2024-03-13
Payer: MEDICARE

## 2024-03-13 VITALS — HEIGHT: 69 IN | WEIGHT: 181 LBS | BODY MASS INDEX: 26.81 KG/M2

## 2024-03-13 DIAGNOSIS — M25.562 PAIN IN LEFT KNEE: ICD-10-CM

## 2024-03-13 DIAGNOSIS — M25.562 PAIN IN RIGHT KNEE: ICD-10-CM

## 2024-03-13 DIAGNOSIS — M79.18 MYALGIA, OTHER SITE: ICD-10-CM

## 2024-03-13 DIAGNOSIS — M25.561 PAIN IN RIGHT KNEE: ICD-10-CM

## 2024-03-13 PROCEDURE — 20611 DRAIN/INJ JOINT/BURSA W/US: CPT | Mod: LT

## 2024-03-13 PROCEDURE — 73564 X-RAY EXAM KNEE 4 OR MORE: CPT | Mod: 50

## 2024-03-13 PROCEDURE — 20610 DRAIN/INJ JOINT/BURSA W/O US: CPT | Mod: RT

## 2024-03-13 PROCEDURE — 99214 OFFICE O/P EST MOD 30 MIN: CPT | Mod: 25

## 2024-03-13 NOTE — PHYSICAL EXAM
[de-identified] : Neurovascularly intact distally   Bilateral Knee: ROM 0-120 no effusion Medial joint line tenderness   Medial facet of patella tenderness

## 2024-03-13 NOTE — HISTORY OF PRESENT ILLNESS
[de-identified] : The patient is a 73 year old RIGHT hand dominant male who presents today complaining of BL knee pain, R worse than L.   Date of Injury/Onset: Chronic  Pain:    At Rest: 2/10  With Activity:  10/10  Mechanism of injury: Insidious  This is NOT a Work Related Injury being treated under Worker's Compensation. This is NOT an athletic injury occurring associated with an interscholastic or organized sports team. Quality of symptoms: aching, soreness, sharp, stiffness Improves with: Injections, Compression sleeve Worse with: sleeping, walking, stairs Prior treatment: CSI, Gel inj Prior Imaging: X-ray/MRI  Out of work/sport: _, since _ School/Sport/Position/Occupation:_ Additional Information: None

## 2024-03-13 NOTE — ADDENDUM
[FreeTextEntry1] :  Documented by Sylvain Lee acting as a scribe for Dr. Stewart and Poli Scott PA-C on 03/13/2024 and was presence for the following sections: Physical Exam; Data Reviewed; Assessment; Discussion/Summary. All medical record entries made by the Scribe were at my, Dr. Stewart, and Poli Scott, direction and personally dictated by me on 03/13/2024. I have reviewed the chart and agree that the record accurately reflects my personal performance of the history, physical exam, procedure and imaging.

## 2024-03-13 NOTE — DISCUSSION/SUMMARY
[de-identified] : Reviewed all X Ray images with patient today and interpretation was provided. Discussed treatment options, the patient would like to follow through with Hyaluronic Acid Injection.  Patient will follow up as needed.    RB&A to corticosteroid injection discussed. All questions were answered. Patient wishes to move forward with injection today.    BILATERAL KNEES MONOVISC INJECTION - ULTRASOUND GUIDED  Patient Identification Name/: Verbal with patient and/or family Procedure Verification: Procedure confirmed with patient or family/designee Consent for procedure: Verbal Consent Given Relevant documentation completed, reviewed, and signed Clinical indications for procedure confirmed Time-out with all members of procedure team immediately prior to procedure: Correct patient identified. Agreement on procedure. Correct side and site.  KNEE INTRAARTICULAR INJECTION - BILATERAL After verbal consent and identification of the correct patient and correct site, the BILATERAL knees were prepped using alcohol swabs and betadine. This was allowed time to air dry. The pre-loaded MONOVISC was injected into the BILATERAL KNEES via the lateral knee portal with a 1 inch 22G needle. Ultrasound was used to ensure proper needle placement. The patient tolerated the procedure well. A sterile dressing was placed. After-care instructions were provided and included instructions to ice the area and to call if redness, pain, or fever develop.  ----------------------------------------------- Home Exercise The patient is instructed on a home exercise program.  SABRINA LEE Acting as a Scribe for Dr. Terry GIRARD, Sabrina Lee, attest that this documentation has been prepared under the direction and in the presence of Provider Chapo Stewart MD.  Activity Modification The patient was advised to modify their activities.  Dx / Natural History The patient was advised of the diagnosis.  The natural history of the pathology was explained in full to the patient in layman's terms.  Several different treatment options were discussed and explained in full to the patient including the risks and benefits of both surgical and non-surgical treatments.  All questions and concerns were answered.  Pain Guide Activities The patient was advised to let pain guide the gradual advancement of activities.  PARMJIT GIRARD explained to the patient that rest, ice, compression, and elevation would benefit them.  They may return to activity after follow-up or when they no longer have any pain.  The patient's current medication management of their orthopedic diagnosis was reviewed today: (1) We discussed a comprehensive treatment plan that included possible pharmaceutical management involving the use of prescription strength medications including but not limited to options such as oral Naprosyn 500mg BID, once daily Meloxicam 15 mg, or 500-650 mg Tylenol versus over the counter oral medications and topical prescription NSAID Pennsaid vs over the counter Voltaren gel. (2) There is a moderate risk of morbidity with further treatment, especially from use of prescription strength medications and possible side effects of these medications which include upset stomach with oral medications, skin reactions to topical medications and cardiac/renal issues with long term use. (3) I recommended that the patient follow-up with their medical physician to discuss any significant specific potential issues with long term medication use such as interactions with current medications or with exacerbation of underlying medical comorbidities. (4) The benefits and risks associated with use of injectable, oral or topical, prescription and over the counter anti-inflammatory medications were discussed with the patient. The patient voiced understanding of the risks including but not limited to bleeding, stroke, kidney dysfunction, heart disease, and were referred to the black box warning label for further information.

## 2024-05-13 ENCOUNTER — APPOINTMENT (OUTPATIENT)
Dept: ORTHOPEDIC SURGERY | Facility: CLINIC | Age: 74
End: 2024-05-13
Payer: MEDICARE

## 2024-05-13 DIAGNOSIS — M25.511 PAIN IN RIGHT SHOULDER: ICD-10-CM

## 2024-05-13 PROCEDURE — L3960: CPT

## 2024-05-13 PROCEDURE — 99214 OFFICE O/P EST MOD 30 MIN: CPT

## 2024-05-13 RX ORDER — OXYCODONE 5 MG/1
5 TABLET ORAL
Qty: 30 | Refills: 0 | Status: ACTIVE | COMMUNITY
Start: 2024-05-13 | End: 1900-01-01

## 2024-05-13 NOTE — HISTORY OF PRESENT ILLNESS
[de-identified] : This is a 72yo male presenting to the office c/o ongoing left shoulder pain for many months. Patient has an MRI for review.  Patient had a right RCR done by Dr. Stewart in July 2023. Pain is described as constant, severe in quality. Currently pain is 6/10 and non-radiating. Patient reports pain has been getting progressively worse. Pain is worse at night. Overall pain does improve with rest and ice. Patient denies any numbness or tingling.  3/11/24: Patient here for right shoulder pain. Patient admits slow improvement in forward flexion motion. Pt is 8 months out from right RCR by Dr. Stewart. With respect to the left shoulder pt reports pain has been interfering with ADL's.  5/13/24: Patient returns today for repeat evaluation of right shoulder pain.  Continues to have significant pain and limited range of motion.  Unfortunate patient has recurrent full-thickness rotator cuff tear.  Wishes to discuss surgery.

## 2024-05-13 NOTE — DISCUSSION/SUMMARY
[de-identified] : RUE: AFF 90 deg, ER 30  This is a 74yo male presenting to the office c/o ongoing bilateral shoulder pain for many months MRI demonstrates full thickness U- shaped configured tear of the supraspinatus tendon insertion with up to 3 cm of retraction without muscle atrophy He will continue with conservative management   Regarding the right shoulder, patient has retear and atrophy and can actively forward elevate to 90 degrees. He does have good isolated external rotation strength at 0 degrees of abduction. I recommended focusing on supine forward flexion active and active assist range of motion Considering patient has failed all conservative management we discussed he is a candidate for reverse shoulder arthroplasty. Patient was provided CT scan prescription to complete ASAP.  The patient is indicated for a Right reverse shoulder arthroplasty with biceps tenodesis.   * Surgery: Considering patient has failed all conservative treatment we are requesting authorization for: -	Right shoulder Reverse Shoulder Arthroplasty (CPT 21696) with Biceps Tenodesis (CPT 60862)  We had a long discussion about the risks and benefits of operative and non-operative treatment. We discussed the pertinent risks of surgery which include but are not limited to infection, pain, stiffness, arthrofibrosis, failure to alleviate symptoms, and injury to nerves or vessels. In addition, we discussed in great detail the postoperative protocol to include appropriate bracing and time of immobilization. Patient was fit for the [place] Arc Brace in the office today. We discussed limitations in postoperative driving as well as the appropriate use of pain medication prescribed after surgery. The patient acknowledged all of the above and will proceed with the recommended surgery. Prescriptions for postoperative medications were provided. All final questions were answered to the patients satisfaction. The patient will follow up at his scheduled surgical time.   (1) We discussed a comprehensive treatment plans that included a prescription management plan involving the use of prescription strength medications to include Ibuprofen 600-800 mg TID, versus 500-650 mg Tylenol. We also discussed prescribing topical diclofenac (Voltaren gel) as well as once daily Meloxicam 15 mg. (2) The patient has More Than One chronic injuries/illnesses as outlined, discussed, and documented by ICD 10 codes listed, as well as the HPI and Plan section. There is a moderate risk of morbidity with further treatment, especially from use of prescription strength medications and possible side effects of these medications which include upset stomach and cardiac/renal issues with long term use were discussed. (3) I recommended that the patient follow-up with their medical physician to discuss any significant specific potential issues with long term use such as interactions with current medications or with exacerbation of underlying medical morbidities.   Attestation: I, Shoshana Fernandez , attest that this documentation has been prepared under the direction and in the presence of Provider Bon Gambino MD. The documentation recorded by the scribe, in my presence, accurately reflects the service I personally performed, and the decisions made by me with my edits as appropriate. Bon Gambino MD

## 2024-05-13 NOTE — PHYSICAL EXAM
[de-identified] : Constitutional: The general appearance of the patient is well developed, well nourished, no deformities and well groomed. Normal  Gait: Gait and function is as follows: normal gait.  Skin: Head and neck visualized skin is normal. Left upper extremity visualized skin is normal. Right upper extremity visualized skin is normal. Thoracic Skin of the thoracic spine shows visualized skin is normal.  Cardiovascular: palpable radial pulse bilaterally, good capillary refill in digits of the bilateral upper extremities and no temperature or color changes in the bilateral upper extremities.  Lymphatic: Normal Palpation of lymph nodes in the cervical.  Neurologic: fine motor control in the bilateral upper extremities is intact. Deep Tendon Reflexes in Upper and Lower Extremities Negative Bravo's in the bilateral upper extremities. The patient is oriented to time, place and person. Sensation to light touch intact in the bilateral upper extremities. Mood and Affect is normal.  Right Shoulder: Inspection of the shoulder/upper arm is as follows: no scapula winging, no biceps deformity and no AC joint deformity. Palpation of the shoulder/upper arm is as follows: There is tenderness at the proximal biceps tendon. Range of motion of the shoulder is as follows: Pain with internal rotation, external rotation, abduction and forward flexion. Strength of the shoulder is as follows: Supraspinatus 4/5. External Rotation 4/5. Internal Rotation 4/5. Deltoid 5/5 Ligament Stability and Special Tests of the shoulder is as follows: Neer test is positive. Potts' test is positive. Speed's test is positive  Left Shoulder: Inspection of the shoulder/upper arm is as follows: There is a full, pain-free, stable range of motion of the shoulder with normal strength and no tenderness to palpation.  Neck:  Inspection / Palpation of the cervical spine is as follows: mild paracervical tenderness. Range of motion of the cervical spine is as follows: moderately decreased range of motion of the cervical spine. Stability testing for the cervical spine is as follows Stable range of motion.  Back, including spine: Inspection / Palpation of the thoracic/lumbar spine is as follows: There is a full, pain free, stable range of motion of the thoracic spine with a normal tone and not tenderness to palpation..

## 2024-05-15 ENCOUNTER — APPOINTMENT (OUTPATIENT)
Dept: CT IMAGING | Facility: CLINIC | Age: 74
End: 2024-05-15
Payer: MEDICARE

## 2024-05-15 ENCOUNTER — OUTPATIENT (OUTPATIENT)
Dept: OUTPATIENT SERVICES | Facility: HOSPITAL | Age: 74
LOS: 1 days | End: 2024-05-15
Payer: MEDICARE

## 2024-05-15 ENCOUNTER — RESULT REVIEW (OUTPATIENT)
Age: 74
End: 2024-05-15

## 2024-05-15 DIAGNOSIS — M75.121 COMPLETE ROTATOR CUFF TEAR OR RUPTURE OF RIGHT SHOULDER, NOT SPECIFIED AS TRAUMATIC: ICD-10-CM

## 2024-05-15 PROCEDURE — 73200 CT UPPER EXTREMITY W/O DYE: CPT | Mod: MH

## 2024-05-15 PROCEDURE — 73200 CT UPPER EXTREMITY W/O DYE: CPT | Mod: 26,RT,MH

## 2024-05-20 ENCOUNTER — APPOINTMENT (OUTPATIENT)
Dept: FAMILY MEDICINE | Facility: CLINIC | Age: 74
End: 2024-05-20
Payer: MEDICARE

## 2024-05-20 VITALS
BODY MASS INDEX: 26.73 KG/M2 | TEMPERATURE: 98 F | SYSTOLIC BLOOD PRESSURE: 116 MMHG | WEIGHT: 181 LBS | DIASTOLIC BLOOD PRESSURE: 60 MMHG | OXYGEN SATURATION: 97 % | HEART RATE: 78 BPM

## 2024-05-20 DIAGNOSIS — Z01.818 ENCOUNTER FOR OTHER PREPROCEDURAL EXAMINATION: ICD-10-CM

## 2024-05-20 PROCEDURE — 99213 OFFICE O/P EST LOW 20 MIN: CPT

## 2024-05-20 NOTE — ASSESSMENT
[Patient Optimized for Surgery] : Patient optimized for surgery [As per surgery] : as per surgery [FreeTextEntry4] : 72 yo male with pmh of diabetes, hld presents for clearance for right shoulder surgery  labs stable  ekg sinus

## 2024-05-20 NOTE — HISTORY OF PRESENT ILLNESS
[No Pertinent Cardiac History] : no history of aortic stenosis, atrial fibrillation, coronary artery disease, recent myocardial infarction, or implantable device/pacemaker [No Pertinent Pulmonary History] : no history of asthma, COPD, sleep apnea, or smoking [No Adverse Anesthesia Reaction] : no adverse anesthesia reaction in self or family member [Chronic Anticoagulation] : no chronic anticoagulation [Chronic Kidney Disease] : no chronic kidney disease [Diabetes] : diabetes [(Patient denies any chest pain, claudication, dyspnea on exertion, orthopnea, palpitations or syncope)] : Patient denies any chest pain, claudication, dyspnea on exertion, orthopnea, palpitations or syncope [FreeTextEntry1] : right shoulder replacement [FreeTextEntry2] : 5/28/24 [FreeTextEntry3] : DR. vázquez [FreeTextEntry4] : PT here for a medical clearance no chest pain, no sob, no cough, no fever, no dizziness, no abdominal pain, no n/v/d/c/melena/brbpr/hematuria/dysuria

## 2024-05-28 ENCOUNTER — APPOINTMENT (OUTPATIENT)
Dept: ORTHOPEDIC SURGERY | Facility: HOSPITAL | Age: 74
End: 2024-05-28
Payer: MEDICARE

## 2024-05-28 ENCOUNTER — RESULT REVIEW (OUTPATIENT)
Age: 74
End: 2024-05-28

## 2024-05-28 PROCEDURE — 20680 REMOVAL OF IMPLANT DEEP: CPT | Mod: 59,RT

## 2024-05-28 PROCEDURE — 20680 REMOVAL OF IMPLANT DEEP: CPT | Mod: AS,59,RT

## 2024-05-28 PROCEDURE — 23472 RECONSTRUCT SHOULDER JOINT: CPT | Mod: RT

## 2024-05-28 PROCEDURE — 23430 REPAIR BICEPS TENDON: CPT | Mod: 59,RT

## 2024-05-28 PROCEDURE — 23430 REPAIR BICEPS TENDON: CPT | Mod: AS,59,RT

## 2024-05-28 PROCEDURE — 23472 RECONSTRUCT SHOULDER JOINT: CPT | Mod: AS,RT

## 2024-06-03 ENCOUNTER — RESULT CHARGE (OUTPATIENT)
Age: 74
End: 2024-06-03

## 2024-06-03 ENCOUNTER — APPOINTMENT (OUTPATIENT)
Dept: ORTHOPEDIC SURGERY | Facility: CLINIC | Age: 74
End: 2024-06-03
Payer: MEDICARE

## 2024-06-03 DIAGNOSIS — M75.121 COMPLETE ROTATOR CUFF TEAR OR RUPTURE OF RIGHT SHOULDER, NOT SPECIFIED AS TRAUMATIC: ICD-10-CM

## 2024-06-03 DIAGNOSIS — S46.011D STRAIN OF MUSCLE(S) AND TENDON(S) OF THE ROTATOR CUFF OF RIGHT SHOULDER, SUBSEQUENT ENCOUNTER: ICD-10-CM

## 2024-06-03 PROCEDURE — 73030 X-RAY EXAM OF SHOULDER: CPT | Mod: RT

## 2024-06-03 PROCEDURE — 99024 POSTOP FOLLOW-UP VISIT: CPT

## 2024-06-03 NOTE — PHYSICAL EXAM
[Right] : right shoulder [There are no fractures, subluxations or dislocations. No significant abnormalities are seen] : There are no fractures, subluxations or dislocations. No significant abnormalities are seen [Components well fixed, in good position] : Components well fixed, in good position [de-identified] : Constitutional: The general appearance of the patient is well developed, well nourished, no deformities and well groomed. Normal  Gait: Gait and function is as follows: normal gait.  Skin: Head and neck visualized skin is normal. Left upper extremity visualized skin is normal. Right upper extremity visualized skin is normal. Thoracic Skin of the thoracic spine shows visualized skin is normal.  Cardiovascular: palpable radial pulse bilaterally, good capillary refill in digits of the bilateral upper extremities and no temperature or color changes in the bilateral upper extremities.  Lymphatic: Normal Palpation of lymph nodes in the cervical.  Neurologic: fine motor control in the bilateral upper extremities is intact. Deep Tendon Reflexes in Upper and Lower Extremities Negative Bravo's in the bilateral upper extremities. The patient is oriented to time, place and person. Sensation to light touch intact in the bilateral upper extremities. Mood and Affect is normal.  Right Shoulder:  Inspection of the shoulder/upper arm is as follows: Stable shoulder. Palpation of the shoulder/upper arm is as follows: There is mild diffuse tenderness . Range of motion of the shoulder is as follows: Limited secondary to immobilization/surgery. Strength of the shoulder is as follows:  Deltoid 5/5 Ligament Stability and Special Tests of the shoulder is as follows: Stable shoulder Incisions benign, no erythema/ swelling.  Left Shoulder: Inspection of the shoulder/upper arm is as follows: There is a full, pain-free, stable range of motion of the shoulder with normal strength and no tenderness to palpation.  Neck:  Inspection / Palpation of the cervical spine is as follows: mild paracervical tenderness. Range of motion of the cervical spine is as follows: moderately decreased range of motion of the cervical spine. Stability testing for the cervical spine is as follows Stable range of motion.  Back, including spine: Inspection / Palpation of the thoracic/lumbar spine is as follows: There is a full, pain free, stable range of motion of the thoracic spine with a normal tone and not tenderness to palpation..

## 2024-06-03 NOTE — HISTORY OF PRESENT ILLNESS
[de-identified] : This is a 74yo male presenting to the office c/o ongoing left shoulder pain for many months. Patient has an MRI for review.  Patient had a right RCR done by Dr. Stewart in July 2023. Pain is described as constant, severe in quality. Currently pain is 6/10 and non-radiating. Patient reports pain has been getting progressively worse. Pain is worse at night. Overall pain does improve with rest and ice. Patient denies any numbness or tingling.  3/11/24: Patient here for right shoulder pain. Patient admits slow improvement in forward flexion motion. Pt is 8 months out from right RCR by Dr. Stewart. With respect to the left shoulder pt reports pain has been interfering with ADL's.  5/13/24: Patient returns today for repeat evaluation of right shoulder pain.  Continues to have significant pain and limited range of motion.  Unfortunate patient has recurrent full-thickness rotator cuff tear.  Wishes to discuss surgery. 6/3/24: Patient presents 6 days s/p right reverse total shoulder replacement and biceps tenodesis. Patient is doing well, pain is controlled.  Patient has been compliant with the brace. Denies any fever, chills, drainage.

## 2024-06-03 NOTE — DISCUSSION/SUMMARY
[de-identified] : This is a 74yo male 6 days s/p right reverse total shoulder replacement, and biceps tenodesis.  patient is doing well, pain is controlled.   Incision is healing well with no evidence of infection or hematoma xrays today demonstrates excellent overall alignment.  Patient was provided PT prescription according to reverse protocol. They will start in 3 weeks.  Continue with sling immobilizer x 3 weeks Continue elbow,wrist,hand motion.  Patient will follow up in 1 month.

## 2024-07-01 ENCOUNTER — APPOINTMENT (OUTPATIENT)
Dept: ORTHOPEDIC SURGERY | Facility: CLINIC | Age: 74
End: 2024-07-01
Payer: MEDICARE

## 2024-07-01 DIAGNOSIS — S46.012A STRAIN OF MUSCLE(S) AND TENDON(S) OF THE ROTATOR CUFF OF LEFT SHOULDER, INITIAL ENCOUNTER: ICD-10-CM

## 2024-07-01 DIAGNOSIS — M25.511 PAIN IN RIGHT SHOULDER: ICD-10-CM

## 2024-07-01 DIAGNOSIS — M25.512 PAIN IN LEFT SHOULDER: ICD-10-CM

## 2024-07-01 DIAGNOSIS — Z96.611 PRESENCE OF RIGHT ARTIFICIAL SHOULDER JOINT: ICD-10-CM

## 2024-07-01 PROCEDURE — 99214 OFFICE O/P EST MOD 30 MIN: CPT | Mod: 24

## 2024-07-01 PROCEDURE — 73030 X-RAY EXAM OF SHOULDER: CPT | Mod: RT

## 2024-09-06 ENCOUNTER — OFFICE (OUTPATIENT)
Dept: URBAN - METROPOLITAN AREA CLINIC 12 | Facility: CLINIC | Age: 74
Setting detail: OPHTHALMOLOGY
End: 2024-09-06
Payer: MEDICARE

## 2024-09-06 DIAGNOSIS — H43.812: ICD-10-CM

## 2024-09-06 DIAGNOSIS — H26.493: ICD-10-CM

## 2024-09-06 DIAGNOSIS — H35.373: ICD-10-CM

## 2024-09-06 PROCEDURE — 92250 FUNDUS PHOTOGRAPHY W/I&R: CPT | Performed by: OPTOMETRIST

## 2024-09-06 PROCEDURE — 92012 INTRM OPH EXAM EST PATIENT: CPT | Performed by: OPTOMETRIST

## 2024-09-06 ASSESSMENT — CONFRONTATIONAL VISUAL FIELD TEST (CVF)
OS_FINDINGS: FULL
OD_FINDINGS: FULL

## 2024-09-06 ASSESSMENT — LID EXAM ASSESSMENTS
OD_BLEPHARITIS: 1+
OS_BLEPHARITIS: 1+

## 2024-10-07 ENCOUNTER — APPOINTMENT (OUTPATIENT)
Dept: ORTHOPEDIC SURGERY | Facility: CLINIC | Age: 74
End: 2024-10-07
Payer: MEDICARE

## 2024-10-07 ENCOUNTER — APPOINTMENT (OUTPATIENT)
Dept: ORTHOPEDIC SURGERY | Facility: CLINIC | Age: 74
End: 2024-10-07

## 2024-10-07 DIAGNOSIS — Z96.611 PRESENCE OF RIGHT ARTIFICIAL SHOULDER JOINT: ICD-10-CM

## 2024-10-07 DIAGNOSIS — M75.22 BICIPITAL TENDINITIS, LEFT SHOULDER: ICD-10-CM

## 2024-10-07 DIAGNOSIS — M25.512 PAIN IN LEFT SHOULDER: ICD-10-CM

## 2024-10-07 DIAGNOSIS — M25.511 PAIN IN RIGHT SHOULDER: ICD-10-CM

## 2024-10-07 DIAGNOSIS — S46.012A STRAIN OF MUSCLE(S) AND TENDON(S) OF THE ROTATOR CUFF OF LEFT SHOULDER, INITIAL ENCOUNTER: ICD-10-CM

## 2024-10-07 PROCEDURE — 73030 X-RAY EXAM OF SHOULDER: CPT | Mod: RT

## 2024-10-07 PROCEDURE — 99214 OFFICE O/P EST MOD 30 MIN: CPT

## 2024-10-09 ENCOUNTER — APPOINTMENT (OUTPATIENT)
Dept: ORTHOPEDIC SURGERY | Facility: CLINIC | Age: 74
End: 2024-10-09
Payer: MEDICARE

## 2024-10-09 VITALS — WEIGHT: 181 LBS | BODY MASS INDEX: 26.81 KG/M2 | HEIGHT: 69 IN

## 2024-10-09 DIAGNOSIS — M25.561 PAIN IN RIGHT KNEE: ICD-10-CM

## 2024-10-09 DIAGNOSIS — M25.562 PAIN IN RIGHT KNEE: ICD-10-CM

## 2024-10-09 DIAGNOSIS — M79.18 MYALGIA, OTHER SITE: ICD-10-CM

## 2024-10-09 DIAGNOSIS — S89.92XD UNSPECIFIED INJURY OF LEFT LOWER LEG, SUBSEQUENT ENCOUNTER: ICD-10-CM

## 2024-10-09 DIAGNOSIS — S89.91XD UNSPECIFIED INJURY OF RIGHT LOWER LEG, SUBSEQUENT ENCOUNTER: ICD-10-CM

## 2024-10-09 PROCEDURE — 20610 DRAIN/INJ JOINT/BURSA W/O US: CPT | Mod: LT

## 2024-10-09 PROCEDURE — 99214 OFFICE O/P EST MOD 30 MIN: CPT | Mod: 25

## 2024-10-09 PROCEDURE — 20611 DRAIN/INJ JOINT/BURSA W/US: CPT | Mod: RT
